# Patient Record
Sex: MALE | Race: WHITE | NOT HISPANIC OR LATINO | Employment: PART TIME | URBAN - METROPOLITAN AREA
[De-identification: names, ages, dates, MRNs, and addresses within clinical notes are randomized per-mention and may not be internally consistent; named-entity substitution may affect disease eponyms.]

---

## 2019-07-16 ENCOUNTER — OCCUPATIONAL HEALTH (OUTPATIENT)
Dept: URGENT CARE | Facility: CLINIC | Age: 44
End: 2019-07-16

## 2019-07-16 DIAGNOSIS — Z02.1 PRE-EMPLOYMENT EXAMINATION: Primary | ICD-10-CM

## 2019-07-16 LAB
BILIRUB UR QL STRIP: NEGATIVE
CTP QC/QA: YES
GLUCOSE UR QL STRIP: NEGATIVE
KETONES UR QL STRIP: NEGATIVE
LEUKOCYTE ESTERASE UR QL STRIP: NEGATIVE
PH, POC UA: 6 (ref 5–8)
POC 10 PANEL DRUG SCREEN: NEGATIVE
POC BLOOD, URINE: NEGATIVE
POC NITRATES, URINE: NEGATIVE
PROT UR QL STRIP: NEGATIVE
SP GR UR STRIP: 1.01 (ref 1–1.03)
UROBILINOGEN UR STRIP-ACNC: NORMAL (ref 0.3–2.2)

## 2019-07-16 PROCEDURE — 80305 DRUG TEST PRSMV DIR OPT OBS: CPT | Mod: QW,S$GLB,, | Performed by: NURSE PRACTITIONER

## 2019-07-16 PROCEDURE — 97750 PHYSICAL PERFORMANCE TEST: CPT | Mod: S$GLB,,, | Performed by: NURSE PRACTITIONER

## 2019-07-16 PROCEDURE — 99499 UNLISTED E&M SERVICE: CPT | Mod: S$GLB,,, | Performed by: NURSE PRACTITIONER

## 2019-07-16 PROCEDURE — 97750 PR LIFT TEST: ICD-10-PCS | Mod: S$GLB,,, | Performed by: NURSE PRACTITIONER

## 2019-07-16 PROCEDURE — 99499 PHYSICAL - BASIC COMPLEXITY: ICD-10-PCS | Mod: S$GLB,,, | Performed by: NURSE PRACTITIONER

## 2019-07-16 PROCEDURE — 80305 POCT RAPID DRUG SCREEN 10 PANEL: ICD-10-PCS | Mod: QW,S$GLB,, | Performed by: NURSE PRACTITIONER

## 2020-09-17 PROBLEM — H81.91 PERIPHERAL VESTIBULOPATHY OF RIGHT EAR: Status: ACTIVE | Noted: 2020-09-17

## 2020-09-20 ENCOUNTER — CLINICAL SUPPORT (OUTPATIENT)
Dept: CARDIOLOGY | Facility: HOSPITAL | Age: 45
DRG: 176 | End: 2020-09-20
Attending: EMERGENCY MEDICINE
Payer: MEDICAID

## 2020-09-20 ENCOUNTER — HOSPITAL ENCOUNTER (INPATIENT)
Facility: HOSPITAL | Age: 45
LOS: 3 days | Discharge: HOME | DRG: 176 | End: 2020-09-23
Attending: EMERGENCY MEDICINE | Admitting: FAMILY MEDICINE
Payer: MEDICAID

## 2020-09-20 DIAGNOSIS — I26.99 OTHER ACUTE PULMONARY EMBOLISM WITHOUT ACUTE COR PULMONALE: ICD-10-CM

## 2020-09-20 DIAGNOSIS — I26.99 ACUTE PULMONARY EMBOLISM WITHOUT ACUTE COR PULMONALE, UNSPECIFIED PULMONARY EMBOLISM TYPE: ICD-10-CM

## 2020-09-20 DIAGNOSIS — I26.99 PULMONARY EMBOLI: ICD-10-CM

## 2020-09-20 DIAGNOSIS — I26.99 PULMONARY EMBOLISM: ICD-10-CM

## 2020-09-20 DIAGNOSIS — R55 SYNCOPE, UNSPECIFIED SYNCOPE TYPE: Primary | ICD-10-CM

## 2020-09-20 DIAGNOSIS — I26.99 PE (PULMONARY THROMBOEMBOLISM): ICD-10-CM

## 2020-09-20 DIAGNOSIS — R42 DIZZINESS: ICD-10-CM

## 2020-09-20 PROBLEM — I82.409 ACUTE DEEP VEIN THROMBOSIS (DVT) OF LOWER EXTREMITY: Status: ACTIVE | Noted: 2020-09-20

## 2020-09-20 LAB
ALBUMIN SERPL BCP-MCNC: 4 G/DL (ref 3.5–5.2)
ALP SERPL-CCNC: 49 U/L (ref 55–135)
ALT SERPL W/O P-5'-P-CCNC: 34 U/L (ref 10–44)
AMPHET+METHAMPHET UR QL: NEGATIVE
ANION GAP SERPL CALC-SCNC: 10 MMOL/L (ref 8–16)
APTT PPP: 24.9 SEC (ref 23.6–33.3)
AST SERPL-CCNC: 29 U/L (ref 10–40)
BARBITURATES UR QL SCN>200 NG/ML: NEGATIVE
BASOPHILS # BLD AUTO: 0.04 K/UL (ref 0–0.2)
BASOPHILS NFR BLD: 0.5 % (ref 0–1.9)
BENZODIAZ UR QL SCN>200 NG/ML: NORMAL
BILIRUB SERPL-MCNC: 1 MG/DL (ref 0.1–1)
BNP SERPL-MCNC: 38 PG/ML (ref 0–99)
BUN SERPL-MCNC: 7 MG/DL (ref 6–20)
BZE UR QL SCN: NEGATIVE
CALCIUM SERPL-MCNC: 8.9 MG/DL (ref 8.7–10.5)
CANNABINOIDS UR QL SCN: NEGATIVE
CHLORIDE SERPL-SCNC: 103 MMOL/L (ref 95–110)
CO2 SERPL-SCNC: 26 MMOL/L (ref 23–29)
CREAT SERPL-MCNC: 1 MG/DL (ref 0.5–1.4)
CREAT UR-MCNC: 94 MG/DL (ref 23–375)
DIFFERENTIAL METHOD: NORMAL
EOSINOPHIL # BLD AUTO: 0.2 K/UL (ref 0–0.5)
EOSINOPHIL NFR BLD: 2.2 % (ref 0–8)
ERYTHROCYTE [DISTWIDTH] IN BLOOD BY AUTOMATED COUNT: 13.9 % (ref 11.5–14.5)
EST. GFR  (AFRICAN AMERICAN): >60 ML/MIN/1.73 M^2
EST. GFR  (NON AFRICAN AMERICAN): >60 ML/MIN/1.73 M^2
GLUCOSE SERPL-MCNC: 103 MG/DL (ref 70–110)
HCT VFR BLD AUTO: 44.9 % (ref 40–54)
HGB BLD-MCNC: 14.7 G/DL (ref 14–18)
IMM GRANULOCYTES # BLD AUTO: 0.03 K/UL (ref 0–0.04)
IMM GRANULOCYTES NFR BLD AUTO: 0.4 % (ref 0–0.5)
INR PPP: 1.2
LYMPHOCYTES # BLD AUTO: 1.6 K/UL (ref 1–4.8)
LYMPHOCYTES NFR BLD: 20.1 % (ref 18–48)
MAGNESIUM SERPL-MCNC: 2.1 MG/DL (ref 1.6–2.6)
MCH RBC QN AUTO: 27.5 PG (ref 27–31)
MCHC RBC AUTO-ENTMCNC: 32.7 G/DL (ref 32–36)
MCV RBC AUTO: 84 FL (ref 82–98)
MONOCYTES # BLD AUTO: 0.7 K/UL (ref 0.3–1)
MONOCYTES NFR BLD: 9 % (ref 4–15)
NEUTROPHILS # BLD AUTO: 5.3 K/UL (ref 1.8–7.7)
NEUTROPHILS NFR BLD: 67.8 % (ref 38–73)
NRBC BLD-RTO: 0 /100 WBC
OPIATES UR QL SCN: NEGATIVE
PCP UR QL SCN>25 NG/ML: NEGATIVE
PLATELET # BLD AUTO: 183 K/UL (ref 150–350)
PMV BLD AUTO: 10.1 FL (ref 9.2–12.9)
POTASSIUM SERPL-SCNC: 3.2 MMOL/L (ref 3.5–5.1)
PROT SERPL-MCNC: 6.9 G/DL (ref 6–8.4)
PROTHROMBIN TIME: 14.7 SEC (ref 10.6–14.8)
RBC # BLD AUTO: 5.34 M/UL (ref 4.6–6.2)
SARS-COV-2 RDRP RESP QL NAA+PROBE: NEGATIVE
SODIUM SERPL-SCNC: 139 MMOL/L (ref 136–145)
TOXICOLOGY INFORMATION: NORMAL
TROPONIN I SERPL DL<=0.01 NG/ML-MCNC: <0.03 NG/ML
TSH SERPL DL<=0.005 MIU/L-ACNC: 2.52 UIU/ML (ref 0.34–5.6)
TSH SERPL DL<=0.005 MIU/L-ACNC: 2.53 UIU/ML (ref 0.34–5.6)
WBC # BLD AUTO: 7.85 K/UL (ref 3.9–12.7)

## 2020-09-20 PROCEDURE — 25000003 PHARM REV CODE 250: Performed by: NURSE PRACTITIONER

## 2020-09-20 PROCEDURE — 63600175 PHARM REV CODE 636 W HCPCS: Performed by: EMERGENCY MEDICINE

## 2020-09-20 PROCEDURE — 93010 ELECTROCARDIOGRAM REPORT: CPT | Mod: ,,, | Performed by: SPECIALIST

## 2020-09-20 PROCEDURE — 93306 TTE W/DOPPLER COMPLETE: CPT

## 2020-09-20 PROCEDURE — 93306 ECHO (CUPID ONLY): ICD-10-PCS | Mod: 26,,, | Performed by: SPECIALIST

## 2020-09-20 PROCEDURE — 83880 ASSAY OF NATRIURETIC PEPTIDE: CPT

## 2020-09-20 PROCEDURE — 93306 TTE W/DOPPLER COMPLETE: CPT | Mod: 26,,, | Performed by: SPECIALIST

## 2020-09-20 PROCEDURE — 83735 ASSAY OF MAGNESIUM: CPT

## 2020-09-20 PROCEDURE — 96372 THER/PROPH/DIAG INJ SC/IM: CPT | Mod: 59

## 2020-09-20 PROCEDURE — 94761 N-INVAS EAR/PLS OXIMETRY MLT: CPT

## 2020-09-20 PROCEDURE — 85610 PROTHROMBIN TIME: CPT

## 2020-09-20 PROCEDURE — 99900035 HC TECH TIME PER 15 MIN (STAT)

## 2020-09-20 PROCEDURE — 96374 THER/PROPH/DIAG INJ IV PUSH: CPT

## 2020-09-20 PROCEDURE — 85730 THROMBOPLASTIN TIME PARTIAL: CPT

## 2020-09-20 PROCEDURE — 93005 ELECTROCARDIOGRAM TRACING: CPT | Performed by: SPECIALIST

## 2020-09-20 PROCEDURE — 84443 ASSAY THYROID STIM HORMONE: CPT

## 2020-09-20 PROCEDURE — 63600175 PHARM REV CODE 636 W HCPCS: Performed by: NURSE PRACTITIONER

## 2020-09-20 PROCEDURE — U0002 COVID-19 LAB TEST NON-CDC: HCPCS

## 2020-09-20 PROCEDURE — 80053 COMPREHEN METABOLIC PANEL: CPT

## 2020-09-20 PROCEDURE — 99291 CRITICAL CARE FIRST HOUR: CPT

## 2020-09-20 PROCEDURE — G0378 HOSPITAL OBSERVATION PER HR: HCPCS

## 2020-09-20 PROCEDURE — 25000003 PHARM REV CODE 250: Performed by: INTERNAL MEDICINE

## 2020-09-20 PROCEDURE — 80307 DRUG TEST PRSMV CHEM ANLYZR: CPT

## 2020-09-20 PROCEDURE — 25500020 PHARM REV CODE 255: Performed by: EMERGENCY MEDICINE

## 2020-09-20 PROCEDURE — 84484 ASSAY OF TROPONIN QUANT: CPT | Mod: 91

## 2020-09-20 PROCEDURE — 36415 COLL VENOUS BLD VENIPUNCTURE: CPT

## 2020-09-20 PROCEDURE — 63600175 PHARM REV CODE 636 W HCPCS: Performed by: STUDENT IN AN ORGANIZED HEALTH CARE EDUCATION/TRAINING PROGRAM

## 2020-09-20 PROCEDURE — 93010 EKG 12-LEAD: ICD-10-PCS | Mod: ,,, | Performed by: SPECIALIST

## 2020-09-20 PROCEDURE — 84443 ASSAY THYROID STIM HORMONE: CPT | Mod: 91

## 2020-09-20 PROCEDURE — 21400001 HC TELEMETRY ROOM

## 2020-09-20 PROCEDURE — 85025 COMPLETE CBC W/AUTO DIFF WBC: CPT

## 2020-09-20 RX ORDER — OXYCODONE AND ACETAMINOPHEN 10; 325 MG/1; MG/1
1 TABLET ORAL ONCE
Status: DISCONTINUED | OUTPATIENT
Start: 2020-09-20 | End: 2020-09-20

## 2020-09-20 RX ORDER — POTASSIUM CHLORIDE 20 MEQ/1
20 TABLET, EXTENDED RELEASE ORAL
Status: DISCONTINUED | OUTPATIENT
Start: 2020-09-20 | End: 2020-09-23 | Stop reason: HOSPADM

## 2020-09-20 RX ORDER — POTASSIUM CHLORIDE 7.45 MG/ML
40 INJECTION INTRAVENOUS
Status: DISCONTINUED | OUTPATIENT
Start: 2020-09-20 | End: 2020-09-23 | Stop reason: HOSPADM

## 2020-09-20 RX ORDER — POTASSIUM CHLORIDE 20 MEQ/1
40 TABLET, EXTENDED RELEASE ORAL ONCE
Status: COMPLETED | OUTPATIENT
Start: 2020-09-20 | End: 2020-09-20

## 2020-09-20 RX ORDER — ACETAMINOPHEN 325 MG/1
650 TABLET ORAL EVERY 8 HOURS PRN
Status: DISCONTINUED | OUTPATIENT
Start: 2020-09-20 | End: 2020-09-23 | Stop reason: HOSPADM

## 2020-09-20 RX ORDER — LANOLIN ALCOHOL/MO/W.PET/CERES
800 CREAM (GRAM) TOPICAL
Status: DISCONTINUED | OUTPATIENT
Start: 2020-09-20 | End: 2020-09-23 | Stop reason: HOSPADM

## 2020-09-20 RX ORDER — HYDROMORPHONE HYDROCHLORIDE 1 MG/ML
1 INJECTION, SOLUTION INTRAMUSCULAR; INTRAVENOUS; SUBCUTANEOUS
Status: DISCONTINUED | OUTPATIENT
Start: 2020-09-20 | End: 2020-09-20

## 2020-09-20 RX ORDER — TALC
6 POWDER (GRAM) TOPICAL NIGHTLY PRN
Status: DISCONTINUED | OUTPATIENT
Start: 2020-09-20 | End: 2020-09-23 | Stop reason: HOSPADM

## 2020-09-20 RX ORDER — POTASSIUM CHLORIDE 7.45 MG/ML
20 INJECTION INTRAVENOUS
Status: DISCONTINUED | OUTPATIENT
Start: 2020-09-20 | End: 2020-09-23 | Stop reason: HOSPADM

## 2020-09-20 RX ORDER — FAMOTIDINE 10 MG/ML
20 INJECTION INTRAVENOUS EVERY 12 HOURS
Status: DISCONTINUED | OUTPATIENT
Start: 2020-09-20 | End: 2020-09-20

## 2020-09-20 RX ORDER — ENOXAPARIN SODIUM 100 MG/ML
1 INJECTION SUBCUTANEOUS
Status: COMPLETED | OUTPATIENT
Start: 2020-09-20 | End: 2020-09-20

## 2020-09-20 RX ORDER — HYDROCODONE BITARTRATE AND ACETAMINOPHEN 5; 325 MG/1; MG/1
1 TABLET ORAL EVERY 6 HOURS PRN
Status: DISCONTINUED | OUTPATIENT
Start: 2020-09-20 | End: 2020-09-23 | Stop reason: HOSPADM

## 2020-09-20 RX ORDER — METOCLOPRAMIDE HYDROCHLORIDE 5 MG/ML
10 INJECTION INTRAMUSCULAR; INTRAVENOUS
Status: COMPLETED | OUTPATIENT
Start: 2020-09-20 | End: 2020-09-20

## 2020-09-20 RX ORDER — MAGNESIUM SULFATE HEPTAHYDRATE 40 MG/ML
2 INJECTION, SOLUTION INTRAVENOUS
Status: DISCONTINUED | OUTPATIENT
Start: 2020-09-20 | End: 2020-09-23 | Stop reason: HOSPADM

## 2020-09-20 RX ORDER — HYDROMORPHONE HYDROCHLORIDE 1 MG/ML
0.5 INJECTION, SOLUTION INTRAMUSCULAR; INTRAVENOUS; SUBCUTANEOUS EVERY 4 HOURS PRN
Status: DISCONTINUED | OUTPATIENT
Start: 2020-09-20 | End: 2020-09-20

## 2020-09-20 RX ORDER — ONDANSETRON 2 MG/ML
4 INJECTION INTRAMUSCULAR; INTRAVENOUS
Status: DISCONTINUED | OUTPATIENT
Start: 2020-09-20 | End: 2020-09-20

## 2020-09-20 RX ORDER — MAGNESIUM SULFATE HEPTAHYDRATE 40 MG/ML
4 INJECTION, SOLUTION INTRAVENOUS
Status: DISCONTINUED | OUTPATIENT
Start: 2020-09-20 | End: 2020-09-23 | Stop reason: HOSPADM

## 2020-09-20 RX ORDER — POTASSIUM CHLORIDE 20 MEQ/1
40 TABLET, EXTENDED RELEASE ORAL
Status: DISCONTINUED | OUTPATIENT
Start: 2020-09-20 | End: 2020-09-23 | Stop reason: HOSPADM

## 2020-09-20 RX ORDER — SODIUM CHLORIDE 0.9 % (FLUSH) 0.9 %
10 SYRINGE (ML) INJECTION
Status: DISCONTINUED | OUTPATIENT
Start: 2020-09-20 | End: 2020-09-23 | Stop reason: HOSPADM

## 2020-09-20 RX ORDER — ENOXAPARIN SODIUM 100 MG/ML
1 INJECTION SUBCUTANEOUS
Status: DISCONTINUED | OUTPATIENT
Start: 2020-09-20 | End: 2020-09-23 | Stop reason: HOSPADM

## 2020-09-20 RX ORDER — MAGNESIUM SULFATE 1 G/100ML
1 INJECTION INTRAVENOUS
Status: DISCONTINUED | OUTPATIENT
Start: 2020-09-20 | End: 2020-09-23 | Stop reason: HOSPADM

## 2020-09-20 RX ADMIN — ENOXAPARIN SODIUM 80 MG: 100 INJECTION SUBCUTANEOUS at 05:09

## 2020-09-20 RX ADMIN — ACETAMINOPHEN 650 MG: 325 TABLET, FILM COATED ORAL at 09:09

## 2020-09-20 RX ADMIN — ACETAMINOPHEN 650 MG: 325 TABLET, FILM COATED ORAL at 06:09

## 2020-09-20 RX ADMIN — METOCLOPRAMIDE 10 MG: 5 INJECTION, SOLUTION INTRAMUSCULAR; INTRAVENOUS at 04:09

## 2020-09-20 RX ADMIN — ENOXAPARIN SODIUM 80 MG: 80 INJECTION, SOLUTION INTRAVENOUS; SUBCUTANEOUS at 04:09

## 2020-09-20 RX ADMIN — IOHEXOL 100 ML: 350 INJECTION, SOLUTION INTRAVENOUS at 04:09

## 2020-09-20 RX ADMIN — POTASSIUM CHLORIDE 40 MEQ: 20 TABLET, EXTENDED RELEASE ORAL at 09:09

## 2020-09-20 NOTE — PROGRESS NOTES
"Admitted earlier today by overnight provider. Admission H&P and recent ED visit at [facility] (09/17) reviewed.     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 Around the same time frame he has been experiencing dizzy spells with falls. No triggers. Had extensive work-up for syncope at [facility] ED on 09/17. MRI brain with no acute intracranial abnormality. Seen by cardiology and was placed on 30-day Holter monitor and discharged home. Returns to our ED today for similar fainting spell associated with right sided chest tightness. Work-up revealed right upper lobe segmental pulmonary embolism. Started on enoxaparin at therapeutic dose.     Assessment  Syncope likely due to pulmonary embolism   Right upper lobe segmental pulmonary embolism; likely unprovoked     Plan:  Check orthostatics  Check Holter monitor to assess for arrhythmias   Continue enoxaparin. Consider warfarin for anticoagulation due to cost issues  Will need hypercoagulability work-up (outpatient)  Echocardiogram   Cardiology and pulmonology consulted on admission; follow recommendations       Michael Jacinto MD  Internal Medicine    "

## 2020-09-20 NOTE — ED PROVIDER NOTES
Encounter Date: 9/20/2020       History     Chief Complaint   Patient presents with    Dizziness     dizzy and numbness to whole body     45-year-old male presents to the ER with a chief complaint of dizziness, syncope and chest pain.  Patient states that he had a syncopal episode today however he was able to be caught for his head struck the ground.  His partner states that he has been having week's worth of fainting.  He was seen at [facility] with a he get an MRI and head CT as well as extensive workup which revealed no etiology to his syncope.  He was discharged to the Holter monitor.  Prior to arrival he had another syncopal episode this time associated with chest pain right upper chest nonradiating described as a pressure with no exacerbating or relieving factors.        Review of patient's allergies indicates:   Allergen Reactions    Pcn [penicillins]      No past medical history on file.  No past surgical history on file.  No family history on file.  Social History     Tobacco Use    Smoking status: Never Smoker    Smokeless tobacco: Current User   Substance Use Topics    Alcohol use: Not on file    Drug use: Not on file     Review of Systems   Constitutional: Negative for chills, fatigue and fever.   HENT: Negative for nosebleeds and sore throat.    Eyes: Negative for pain and discharge.   Respiratory: Negative for choking, shortness of breath and wheezing.    Cardiovascular: Positive for chest pain. Negative for palpitations.   Gastrointestinal: Negative for abdominal distention, abdominal pain, nausea and vomiting.   Genitourinary: Negative for dysuria, flank pain and urgency.   Musculoskeletal: Negative for back pain.   Skin: Negative for rash and wound.   Neurological: Positive for dizziness, syncope, numbness (Bilateral lower extremity) and headaches. Negative for weakness and light-headedness.   Hematological: Does not bruise/bleed easily.       Physical Exam     Initial Vitals  [09/20/20 0200]   BP Pulse Resp Temp SpO2   (!) 140/88 84 16 97.8 °F (36.6 °C) 99 %      MAP       --         Physical Exam    Nursing note and vitals reviewed.  Constitutional: He appears well-developed and well-nourished. No distress.   HENT:   Head: Normocephalic and atraumatic.   Mouth/Throat: Oropharynx is clear and moist. No oropharyngeal exudate.   Eyes: Pupils are equal, round, and reactive to light. Right eye exhibits no discharge. Left eye exhibits no discharge.   Neck: Normal range of motion. No tracheal deviation present. No JVD present.   Cardiovascular: Normal rate, regular rhythm and normal heart sounds. Exam reveals no gallop and no friction rub.    No murmur heard.  Pulmonary/Chest: Breath sounds normal. No respiratory distress. He has no wheezes. He has no rhonchi. He has no rales.   Abdominal: Soft. Bowel sounds are normal. He exhibits no distension. There is no abdominal tenderness. There is no rebound.   Musculoskeletal: No tenderness or edema.   Neurological: He is alert and oriented to person, place, and time. He has normal strength. No sensory deficit. GCS score is 15. GCS eye subscore is 4. GCS verbal subscore is 5. GCS motor subscore is 6.   Patient has 5/5 motor in upper and lower extremities.  Patient .  Unknown heel-to-shin are normal.  Patient intact sensation to soft touch in upper and lower extremities.  Cranial nerves 2 through 12 are intact.   Skin: Skin is warm. No erythema.         ED Course   Procedures  Labs Reviewed   COMPREHENSIVE METABOLIC PANEL - Abnormal; Notable for the following components:       Result Value    Potassium 3.2 (*)     Alkaline Phosphatase 49 (*)     All other components within normal limits   CBC W/ AUTO DIFFERENTIAL   B-TYPE NATRIURETIC PEPTIDE   TROPONIN I   SARS-COV-2 RNA AMPLIFICATION, QUAL   DRUG SCREEN PANEL, URINE EMERGENCY    Narrative:     Specimen Source->Urine   TROPONIN I   TSH   PROTIME-INR   APTT   TSH   TROPONIN I     EKG Readings:  (Independently Interpreted)   Initial Reading: No STEMI. Rhythm: Normal Sinus Rhythm. Ectopy: No Ectopy. Conduction: Normal. ST Segments: Normal ST Segments. T Waves: Normal.     ECG Results          EKG 12-lead (In process)  Result time 09/20/20 05:14:51    In process by Interface, Lab In [facility] (09/20/20 05:14:51)                 Narrative:    Test Reason : R42,    Vent. Rate : 077 BPM     Atrial Rate : 077 BPM     P-R Int : 138 ms          QRS Dur : 084 ms      QT Int : 372 ms       P-R-T Axes : 036 035 029 degrees     QTc Int : 420 ms    Normal sinus rhythm  Normal ECG  When compared with ECG of 17-SEP-2020 11:12,  No significant change was found    Referred By: [email or web address]   SELF           Confirmed By:                             Imaging Results          CTA Chest Non Coronary (Final result)  Result time 09/20/20 04:33:00    Final result by Gretel Green DO (09/20/20 04:33:00)                 Narrative:    CT ANGIOGRAM CHEST WITH IV CONTRAST: 9/20/2020 5:40 AM CDT    HISTORY: 45-year old patient with dizziness, chest pain.    TECHNIQUE:  Postcontrast CT through the chest was performed per protocol for CT angiography.  3D Multiplanar reformations were performed at the workstation. Reconstructed sagittal and coronal images were also obtained through the chest. This exam was performed according to our departmental dose-optimization program, which includes automated exposure control, adjustment of the mA and/or KV according to the patient's size and/or use of iterative reconstruction technique.    COMPARISON: None available    FINDINGS: The heart size is at the upper limits of normal in size. No large pericardial effusion is seen.    No significant mediastinal, supraclavicular, or axillary lymphadenopathy is seen.    The thoracic aorta is within normal limits of size.    There are filling defects seen within the right upper lobe segmental arteries consistent with pulmonary artery emboli. The main pulmonary  "artery is within normal limits in size. The right ventricle does not appear to be significantly enlarged.    The thyroid gland is unremarkable.    The central tracheobronchial tree is patent.    No focal consolidative airspace opacity is seen.    No discrete pleural effusion is seen.    Evaluation for lung nodules is limited by some respiratory motion artifact.    There is no evidence of a pneumothorax.    The bones demonstrate no suspicious lytic or blastic lesion.    The visualized portions of the upper abdomen appear grossly unremarkable.    IMPRESSION:  No acute airspace opacities are seen.    There are filling defects at the right upper lobe segmental arteries consistent with pulmonary emboli.    Electronically signed by:  Gretel Green DO  9/20/2020 5:42 AM CDT Workstation: [id number]                               Medical Decision Making:   Initial Assessment:   45-year-old male presents the ER with a chief complaint of syncope, chest pain.  Patient seen an outside facility with extensive workup for syncope however he came in tonight for the chest pain that occurred after his syncopal episode.  Differential Diagnosis:   Dehydration, anemia, arrhythmia, electrolyte abnormality, pulmonary embolism,.  Patient's MRI at outside facility ruled out CVA so it is unlikely this is the etiology of his dizziness.  ED Management:  Patient's EKG and troponin within normal limits.  Patient's urine tox was positive for benzos.  Patient's CBC and CMP is within normal limits.  Troponin BNP are within normal limits.  Patient's CT angio showed right upper lobe pulmonary embolism.  Patient states his history of pulmonary embolism was on Eliquis over is unable to afford it at this time.  For consult to hospitalist for anticoagulation    Artis Cage MD PGY3  "9/20/2020" 5:50AM            Attending Attestation:         Attending Critical Care:   Critical Care Times:   Direct Patient Care (initial evaluation, reassessments, and time " considering the case)................................................................15 minutes.   Additional History from reviewing old medical records or taking additional history from the family, EMS, PCP, etc.......................5 minutes.   Ordering, Reviewing, and Interpreting Diagnostic Studies...............................................................................................................5 minutes.   Documentation..................................................................................................................................................................................10 minutes.   Consultation with other Physicians. .................................................................................................................................................5 minutes.   ==============================================================  · Total Critical Care Time - exclusive of procedural time: 40 minutes.  ==============================================================  Critical care was necessary to treat or prevent imminent or life-threatening deterioration of the following conditions: anaphylaxis.   The following critical care procedures were done by me (see procedure notes): blood draw for specimens and pulse oximetry.   Critical care was time spent personally by me on the following activities: obtaining history from patient or relative, examination of patient, review of x-rays / CT sent with the patient, ordering lab, x-rays, and/or EKG, development of treatment plan with patient or relative, ordering and performing treatments and interventions, evaluation of patient's response to treatment, discussions with primary provider, interpretation of cardiac measurements and re-evaluation of patient's conition.   Critical Care Condition: potentially life-threatening       Attending ED Notes:   I have seen and evaluated the patient at bedside and I mostly agree with the  resident's history, physical examination, clinical impression, and disposition.  Please see my addendum below for further details and explanation.    Patient presents emergency department above-mentioned symptoms.  He is well-appearing nontoxic in no distress.  Further discussion he is out of his Eliquis and has not taken in several weeks.  Says he is unable to afford it.  He did commit to have right-sided chest pain any is found have several right-sided upper lobe pulmonary emboli.  No evidence of right heart strain.  Troponin and BNP negative.  He has been given Lovenox in the emergency department be admitted to hospitalist further care and evaluation of his pulmonary embolus.                    Clinical Impression:       ICD-10-CM ICD-9-CM   1. Dizziness  R42 780.4   2. Pulmonary embolism  I26.99 415.19   3. Pulmonary emboli  I26.99 415.19                          ED Disposition Condition    Observation                             Artis Cage MD  Resident  09/20/20 0550       Rufino Modi,   09/20/20 0614

## 2020-09-20 NOTE — CARE UPDATE
GRANT sent email to JOSE JUAN Padron for pt assistance in applying for medicaid. Pt currently does not have any insurance.

## 2020-09-20 NOTE — PLAN OF CARE
Pt assessment completed at bedside. Pt alone during assessment. Pt reports that he currently lives w/ his significant other. Pt reports he has one adult child. Pt reports that he does not have  a current living will. Pt currently does not have insurance, and reports he has been non compliant on medications because he cannot afford medicines. Pt reports he intends to d/c home w/ family and family will transport. Pt denies any other barriers at the current moment.     Case management will continue to follow          09/20/20 1918   Discharge Assessment   Assessment Type Discharge Planning Assessment   Confirmed/corrected address and phone number on facesheet? Yes   Assessment information obtained from? Patient;Medical Record   Communicated expected length of stay with patient/caregiver no   Prior to hospitilization cognitive status: Alert/Oriented   Prior to hospitalization functional status: Independent   Current cognitive status: Alert/Oriented   Current Functional Status: Independent   Facility Arrived From: home   Lives With significant other   Able to Return to Prior Arrangements yes   Is patient able to care for self after discharge? Yes   Who are your caregiver(s) and their phone number(s)? Ward avalos spouse [phone]   Patient's perception of discharge disposition home or selfcare   Readmission Within the Last 30 Days no previous admission in last 30 days   Patient currently being followed by outpatient case management? No   Patient currently receives any other outside agency services? No   Equipment Currently Used at Home none   Part D Coverage n/a   Do you have any problems affording any of your prescribed medications? No   Is the patient taking medications as prescribed? no   If no, which medications is patient not taking? eloquis - pt does not have insurance   Does the patient have transportation home? Yes   Transportation Anticipated family or friend will provide   Dialysis Name and Scheduled days n/a    Does the patient receive services at the [facility]? No   Discharge Plan A Home with family   Discharge Plan B Home with family   DME Needed Upon Discharge  none   Patient/Family in Agreement with Plan yes

## 2020-09-20 NOTE — PLAN OF CARE
09/20/20 0823   PRE-TX-O2   O2 Device (Oxygen Therapy) room air   SpO2 98 %   Pulse Oximetry Type Continuous   $ Pulse Oximetry - Multiple Charge Pulse Oximetry - Multiple   Pulse 70   Resp 19   Respiratory Evaluation   $ Care Plan Tech Time 15 min

## 2020-09-20 NOTE — ED NOTES
Pt stated that he was walking to his car approx 20 min ago and started to feel dizzy, lightheaded and faint. He stated he started to faint when his significant other caught him from falling. Pt also states it feels like someone is sitting on his chest on the right side.

## 2020-09-20 NOTE — H&P
[facility] Medicine History & Physical Examination   Patient Name: Cricket Brady  MRN: [id number]  Patient Class: Emergency   Admission Date: 9/20/2020  4:20 AM  Length of Stay: 0  Attending Physician:   Primary Care Provider: Primary Doctor No  Face-to-Face encounter date: 09/20/2020  Code Status:Full Code  MPOA:  Chief Complaint: Dizziness (dizzy and numbness to whole body)        Patient information was obtained from patient, past medical records and ER records.   HISTORY OF PRESENT ILLNESS:   Cricket Brady is a 45 y.o. old  male who  has no past medical history on file.. The patient presented to [facility] on 9/20/2020 with a primary complaint of Dizziness (dizzy and numbness to whole body)  .     45-year-old  male presents emergency room complaints of chest pain lightheadedness dizziness and syncope.  The patient states for the past week he has been having worsening lightheadedness and dizziness.  He also complains of some chest pain days midsternal radiating to his right side.  The patient had been having syncope episode full week he was seen at [facility] and had a full neurologic workup included MRI of the brain CT of the head neck all of which were negative.  The neurologist felt that the patient should be admitted route of for COVID-19.  The patient denies fever, chills  REVIEW OF SYSTEMS:   10 Point Review of System was performed and was found to be negative except for that mentioned already in the HPI and   Review of Systems (Negative unless checked off)  Review of Systems   Constitutional: Positive for malaise/fatigue.   HENT: Negative.    Eyes: Negative.    Respiratory: Positive for cough and shortness of breath.    Cardiovascular: Positive for chest pain.   Gastrointestinal: Negative.    Genitourinary: Negative.    Musculoskeletal: Negative.    Skin: Negative.    Neurological: Positive for dizziness.        Syncope   Endo/Heme/Allergies: Negative.      "       PAST MEDICAL HISTORY:   No past medical history on file.    PAST SURGICAL HISTORY:   No past surgical history on file.    ALLERGIES:   Pcn [penicillins]    FAMILY HISTORY:   No family history on file.    SOCIAL HISTORY:     Social History     Tobacco Use    Smoking status: Never Smoker    Smokeless tobacco: Current User   Substance Use Topics    Alcohol use: Not on file        Social History     Substance and Sexual Activity   Sexual Activity Not on file        HOME MEDICATIONS:     Prior to Admission medications    Medication Sig Start Date End Date Taking? Authorizing Provider   apixaban (ELIQUIS) 5 mg (74 tabs) DsPk For the first 7 days take two 5 mg tablets twice daily.  After 7 days take one 5 mg tablet twice daily. 1/26/20   Kevin Chatman NP   meclizine (ANTIVERT) 25 mg tablet Take 1 tablet (25 mg total) by mouth 3 (three) times daily as needed for Dizziness. 9/17/20   Kerry Arita MD         PHYSICAL EXAM:   BP (!) 140/88 (BP Location: Left arm, Patient Position: Sitting)   Pulse 84   Temp 97.8 °F (36.6 °C) (Oral)   Resp 16   Ht 5' 7" (1.702 m)   Wt 79.4 kg (175 lb)   SpO2 99%   BMI 27.41 kg/m²   Vitals Reviewed  General appearance: Well-developed, well-nourished male in no apparent distress.  Skin: No Rash.   Neuro: Motor and sensory exams grossly intact. Good tone. Power in all 4 extremities 5/5.   HENT: Atraumatic head. Moist mucous membranes of oral cavity.  Eyes: Normal extraocular movements.   Neck: Supple. No evidence of lymphadenopathy. No thyroidomegaly.  Lungs: Clear to auscultation bilaterally. No wheezing present.   Heart: Regular rate and rhythm. S1 and S2 present with no murmurs/gallop/rub. No pedal edema. No JVD present.   Abdomen: Soft, non-distended, non-tender. No rebound tenderness/guarding. No masses or organomegaly. Bowel sounds are normal. Bladder is not palpable.   Extremities: No cyanosis, clubbing, or edema.  Psych/mental status: Alert and oriented. Cooperative. " Responds appropriately to questions.   EMERGENCY DEPARTMENT LABS AND IMAGING:   Following labs were Reviewed   No results for input(s): WBC, HGB, HCT, PLT, GLUCOSE, CALCIUM, ALBUMIN, PROT, NA, K, CO2, CL, BUN, CREATININE, ALKPHOS, ALT, AST, BILITOT in the last 24 hours.      BMP: No results for input(s): GLU, NA, K, CL, CO2, BUN, CREATININE, CALCIUM, MG in the last 48 hours., CMP No results for input(s): NA, K, CL, CO2, GLU, BUN, CREATININE, CALCIUM, PROT, ALBUMIN, BILITOT, ALKPHOS, AST, ALT, ANIONGAP, ESTGFRAFRICA, EGFRNONAA in the last 48 hours., CBC No results for input(s): WBC, HGB, HCT, PLT in the last 48 hours., INR No results found for: INR, PROTIME, Lipid Panel No results found for: CHOL, HDL, LDLCALC, TRIG, CHOLHDL, Troponin   Recent Labs   Lab 09/17/20  0821   TROPONINI <0.012   , A1C: No results for input(s): HGBA1C in the last 4320 hours. and All labs within the past 24 hours have been reviewed  Microbiology Results (last 7 days)     ** No results found for the last 168 hours. **        CTA Chest Non Coronary    (Results Pending)     X-ray Chest 1 View    Result Date: 9/17/2020  EXAMINATION: XR CHEST 1 VIEW CLINICAL HISTORY: Dizziness. TECHNIQUE: Single frontal view of the chest was performed. COMPARISON: None FINDINGS: No consolidation, pleural effusion or pneumothorax. Cardiomediastinal silhouette and pulmonary vasculature are within normal limits. No acute osseous abnormality.     No acute findings in the chest. Electronically signed by: Cricket Barrientos Date:    09/17/2020 Time:    08:47    Ct Head Without Contrast    Result Date: 9/17/2020  EXAMINATION: CT HEAD WITHOUT CONTRAST CLINICAL HISTORY: Headache, post traumatic;Head trauma, coagulopathy (Age 19-64y); TECHNIQUE: Multiple cross-section of the head were obtained without the use of contrast.  Coronal and sagittal reformatted images were obtained. COMPARISON: None FINDINGS: No acute ischemic changes or infarct.  No intraparenchymal hemorrhage or  contusion.  No mass, mass effect, midline shift, edema, or extra-axial fluid collection.  The gray-white matter differentiation is maintained.  The cerebral sulci and basal cisterns are normal.  No hydrocephalus. The globes are intact.  The paranasal sinuses and mastoid air cells are well pneumatized.  No evidence for scalp hematoma laceration.  No radiopaque foreign body.     No acute intracranial abnormality.  Findings consistent with microangiopathy. Electronically signed by: Cricket Melendrez MD Date:    09/17/2020 Time:    09:07    Mri Brain Without Contrast    Result Date: 9/17/2020  EXAMINATION: MRI BRAIN WITHOUT CONTRAST CLINICAL HISTORY: Dizziness, non-specific;Dizziness, persistent/recurrent, cardiac or vascular cause suspected; Dizzy for 1 week, unable to hear conversations clearly. No history of seizures, surgery, MS or cancer. TECHNIQUE: Multiplanar multisequence MR imaging of the brain was performed without contrast. COMPARISON: CT head without contrast, 09/17/2020 FINDINGS: INTRACRANIAL: Few scattered T2 FLAIR hyperintense white matter foci are present which can be seen in normal individuals of this age.  Incidental mild tonsillar ectopia with tonsillar tips extending 2 mm beyond the foramen magnum with preserved inferior rounding of the tonsillar tips.  Question slight ventral displacement of the lower brainstem.  No parenchymal restricted diffusion.  No evidence of intracranial hemorrhage.  No extra-axial fluid collection or mass.  No intracranial mass effect.  No hydrocephalus.  Midline structures have a normal configuration.  Visualized pituitary gland and infundibulum are normal.  Visualized major intracranial vascular structures demonstrate normal flow voids and are normal in course and caliber. SINUSES: Trace bilateral ethmoid sinus mucosal thickening.  Trace right mastoid effusion. ORBITS: Visualized orbits are normal.     1. No acute intracranial hemorrhage or infarct. 2. Mild tonsillar ectopia,  approximately 2 mm beyond the foramen magnum, with preserved rounding of the tonsillar tips. Electronically signed by: Cricket Iliana Date:    09/17/2020 Time:    11:44        12 lead EKG reveals a normal sinus rhythm with a normal axis this good R-wave progression this no significant ST or T-wave abnormalities rate 77  milliseconds  ASSESSMENT & PLAN:   Cricket Brady is a 45 y.o. male admitted for    1. Syncope-  Patient had a full neurologic workup and seen 10 minutes hospital  -syncope may sick be secondary to PE  -2D echo complete  -carotid Doppler studies  -cardiology consult    2. PE to right upper lobe  -no saddle PE  -will start on Lovenox full dose  -consult pulmonary      3. H/O DVT has been non-compliant with Eliquis s/t inability to afford this medication Has been off for about 2 weeks  -placed on low-molecular weight heparin    DVT Prophylaxis: will be placed on Lovenox for DVT prophylaxis and will be advised to be as mobile as possible and sit in a chair as tolerated.   ________________________________________________________________________________    Discharge Planning and Disposition: No mobility needs. Ambulating well. Good social support system. Patient will be discharged in   Face-to-Face encounter date: 09/20/2020  Encounter included review of the medical records, interviewing and examining the patient face-to-face, discussion with family and other health care providers including emergency medicine physician, admission orders, interpreting lab/test results and formulating a plan of care.   Medical Decision Making during this encounter was  [_] Low Complexity  [_] Moderate Complexity  [x] High Complexity  _________________________________________________________________________________    INPATIENT LIST OF MEDICATIONS     Current Facility-Administered Medications:     HYDROmorphone injection 1 mg, 1 mg, Intravenous, ED 1 Time, Rufino Modi,     ondansetron injection 4 mg, 4 mg,  Intravenous, ED 1 Time, Rufino Modi, DO    oxyCODONE-acetaminophen  mg per tablet 1 tablet, 1 tablet, Oral, Once, Rufino Modi,     Current Outpatient Medications:     apixaban (ELIQUIS) 5 mg (74 tabs) DsPk, For the first 7 days take two 5 mg tablets twice daily.  After 7 days take one 5 mg tablet twice daily., Disp: 74 tablet, Rfl: 0    meclizine (ANTIVERT) 25 mg tablet, Take 1 tablet (25 mg total) by mouth 3 (three) times daily as needed for Dizziness., Disp: 20 tablet, Rfl: 0      Scheduled Meds:  Continuous Infusions:  PRN Meds:.      Ana Rosa  [facility] Hospitalist NP  09/20/2020

## 2020-09-21 LAB
ALBUMIN SERPL BCP-MCNC: 3.4 G/DL (ref 3.5–5.2)
ALP SERPL-CCNC: 45 U/L (ref 55–135)
ALT SERPL W/O P-5'-P-CCNC: 37 U/L (ref 10–44)
ANION GAP SERPL CALC-SCNC: 9 MMOL/L (ref 8–16)
AST SERPL-CCNC: 28 U/L (ref 10–40)
AT III ACT/NOR PPP CHRO: 89 % (ref 88–132)
AV INDEX (PROSTH): 0.97
AV MEAN GRADIENT: 3 MMHG
AV PEAK GRADIENT: 5 MMHG
AV VALVE AREA: 3.05 CM2
AV VELOCITY RATIO: 91.88
BASOPHILS # BLD AUTO: 0.07 K/UL (ref 0–0.2)
BASOPHILS NFR BLD: 1.2 % (ref 0–1.9)
BILIRUB SERPL-MCNC: 0.7 MG/DL (ref 0.1–1)
BSA FOR ECHO PROCEDURE: 1.93 M2
BUN SERPL-MCNC: 11 MG/DL (ref 6–20)
CALCIUM SERPL-MCNC: 8.9 MG/DL (ref 8.7–10.5)
CHLORIDE SERPL-SCNC: 103 MMOL/L (ref 95–110)
CO2 SERPL-SCNC: 29 MMOL/L (ref 23–29)
CREAT SERPL-MCNC: 1 MG/DL (ref 0.5–1.4)
CV ECHO LV RWT: 0.5 CM
DIFFERENTIAL METHOD: ABNORMAL
DOP CALC AO PEAK VEL: 1.16 M/S
DOP CALC AO VTI: 21.11 CM
DOP CALC LVOT AREA: 3.1 CM2
DOP CALC LVOT DIAMETER: 2 CM
DOP CALC LVOT PEAK VEL: 106.58 M/S
DOP CALC LVOT STROKE VOLUME: 64.46 CM3
DOP CALCLVOT PEAK VEL VTI: 20.53 CM
E WAVE DECELERATION TIME: 233.58 MSEC
E/A RATIO: 1.68
E/E' RATIO: 6.17 M/S
ECHO LV POSTERIOR WALL: 1.07 CM (ref 0.6–1.1)
EOSINOPHIL # BLD AUTO: 0.3 K/UL (ref 0–0.5)
EOSINOPHIL NFR BLD: 4.9 % (ref 0–8)
ERYTHROCYTE [DISTWIDTH] IN BLOOD BY AUTOMATED COUNT: 14 % (ref 11.5–14.5)
EST. GFR  (AFRICAN AMERICAN): >60 ML/MIN/1.73 M^2
EST. GFR  (NON AFRICAN AMERICAN): >60 ML/MIN/1.73 M^2
FIBRINOGEN PPP-MCNC: 330 MG/DL (ref 162–449)
FRACTIONAL SHORTENING: 33 % (ref 28–44)
GLUCOSE SERPL-MCNC: 104 MG/DL (ref 70–110)
HCT VFR BLD AUTO: 44.8 % (ref 40–54)
HGB BLD-MCNC: 14.2 G/DL (ref 14–18)
IMM GRANULOCYTES # BLD AUTO: 0.03 K/UL (ref 0–0.04)
IMM GRANULOCYTES NFR BLD AUTO: 0.5 % (ref 0–0.5)
INTERVENTRICULAR SEPTUM: 1.06 CM (ref 0.6–1.1)
IVRT: 49.73 MSEC
LEFT INTERNAL DIMENSION IN SYSTOLE: 2.83 CM (ref 2.1–4)
LEFT VENTRICLE MASS INDEX: 80 G/M2
LEFT VENTRICULAR INTERNAL DIMENSION IN DIASTOLE: 4.24 CM (ref 3.5–6)
LEFT VENTRICULAR MASS: 152.23 G
LV LATERAL E/E' RATIO: 5.69 M/S
LV SEPTAL E/E' RATIO: 6.73 M/S
LYMPHOCYTES # BLD AUTO: 1.9 K/UL (ref 1–4.8)
LYMPHOCYTES NFR BLD: 31.3 % (ref 18–48)
MCH RBC QN AUTO: 26.9 PG (ref 27–31)
MCHC RBC AUTO-ENTMCNC: 31.7 G/DL (ref 32–36)
MCV RBC AUTO: 85 FL (ref 82–98)
MONOCYTES # BLD AUTO: 0.6 K/UL (ref 0.3–1)
MONOCYTES NFR BLD: 10.2 % (ref 4–15)
MV PEAK A VEL: 0.44 M/S
MV PEAK E VEL: 0.74 M/S
NEUTROPHILS # BLD AUTO: 3.2 K/UL (ref 1.8–7.7)
NEUTROPHILS NFR BLD: 51.9 % (ref 38–73)
NRBC BLD-RTO: 0 /100 WBC
PISA TR MAX VEL: 2.41 M/S
PLATELET # BLD AUTO: 175 K/UL (ref 150–350)
PMV BLD AUTO: 10.1 FL (ref 9.2–12.9)
POTASSIUM SERPL-SCNC: 4.5 MMOL/L (ref 3.5–5.1)
PROT SERPL-MCNC: 6 G/DL (ref 6–8.4)
PV PEAK VELOCITY: 80.67 CM/S
RA PRESSURE: 3 MMHG
RBC # BLD AUTO: 5.27 M/UL (ref 4.6–6.2)
RIGHT VENTRICULAR END-DIASTOLIC DIMENSION: 292 CM
SODIUM SERPL-SCNC: 141 MMOL/L (ref 136–145)
TDI LATERAL: 0.13 M/S
TDI SEPTAL: 0.11 M/S
TDI: 0.12 M/S
TR MAX PG: 23 MMHG
TV REST PULMONARY ARTERY PRESSURE: 26 MMHG
WBC # BLD AUTO: 6.08 K/UL (ref 3.9–12.7)

## 2020-09-21 PROCEDURE — 63600175 PHARM REV CODE 636 W HCPCS: Performed by: NURSE PRACTITIONER

## 2020-09-21 PROCEDURE — 85613 RUSSELL VIPER VENOM DILUTED: CPT

## 2020-09-21 PROCEDURE — 99219 PR INITIAL OBSERVATION CARE,LEVL II: ICD-10-PCS | Mod: ,,, | Performed by: INTERNAL MEDICINE

## 2020-09-21 PROCEDURE — 86147 CARDIOLIPIN ANTIBODY EA IG: CPT | Mod: 59

## 2020-09-21 PROCEDURE — G0378 HOSPITAL OBSERVATION PER HR: HCPCS

## 2020-09-21 PROCEDURE — 81240 F2 GENE: CPT

## 2020-09-21 PROCEDURE — 85025 COMPLETE CBC W/AUTO DIFF WBC: CPT

## 2020-09-21 PROCEDURE — 25000003 PHARM REV CODE 250: Performed by: INTERNAL MEDICINE

## 2020-09-21 PROCEDURE — 81241 F5 GENE: CPT

## 2020-09-21 PROCEDURE — 85384 FIBRINOGEN ACTIVITY: CPT

## 2020-09-21 PROCEDURE — 85300 ANTITHROMBIN III ACTIVITY: CPT

## 2020-09-21 PROCEDURE — 85306 CLOT INHIBIT PROT S FREE: CPT

## 2020-09-21 PROCEDURE — 83090 ASSAY OF HOMOCYSTEINE: CPT

## 2020-09-21 PROCEDURE — 80053 COMPREHEN METABOLIC PANEL: CPT

## 2020-09-21 PROCEDURE — 36415 COLL VENOUS BLD VENIPUNCTURE: CPT

## 2020-09-21 PROCEDURE — 99219 PR INITIAL OBSERVATION CARE,LEVL II: CPT | Mod: ,,, | Performed by: INTERNAL MEDICINE

## 2020-09-21 PROCEDURE — 85303 CLOT INHIBIT PROT C ACTIVITY: CPT

## 2020-09-21 PROCEDURE — 25000003 PHARM REV CODE 250: Performed by: NURSE PRACTITIONER

## 2020-09-21 PROCEDURE — 21400001 HC TELEMETRY ROOM

## 2020-09-21 RX ORDER — WARFARIN SODIUM 5 MG/1
5 TABLET ORAL DAILY
Status: DISCONTINUED | OUTPATIENT
Start: 2020-09-21 | End: 2020-09-22

## 2020-09-21 RX ADMIN — ACETAMINOPHEN 650 MG: 325 TABLET, FILM COATED ORAL at 08:09

## 2020-09-21 RX ADMIN — HYDROCODONE BITARTRATE AND ACETAMINOPHEN 1 TABLET: 5; 325 TABLET ORAL at 11:09

## 2020-09-21 RX ADMIN — ENOXAPARIN SODIUM 80 MG: 80 INJECTION, SOLUTION INTRAVENOUS; SUBCUTANEOUS at 04:09

## 2020-09-21 RX ADMIN — ENOXAPARIN SODIUM 80 MG: 80 INJECTION, SOLUTION INTRAVENOUS; SUBCUTANEOUS at 05:09

## 2020-09-21 RX ADMIN — WARFARIN SODIUM 5 MG: 5 TABLET ORAL at 04:09

## 2020-09-21 NOTE — CONSULTS
Pulmonary/Critical Care Consult      Patient name: Cricket Brady  MRN: [id number]  Date: 09/21/2020    Admit Date: 9/20/2020  Consult Requested By: Michael Jacinto MD    Reason for Consult: PE    HPI:    9/21/2020 - 46 yo male with h/o LLE DVT about 1/2020 - treated with ELiquis  (didn't complete tx due to cost).  Has had issues with some dizziness and light headedness, was seen at [facility] ER with no diagnosis made and then came to [facility] ER, CTA has shown a RUL PE and he is admitted and being treated.  He is not a smoker, has no h/o cancer, no prolonged immobilization, no recent procedures.  He has a family h/o thrombosis (brother with early ASCVD, CVA and amputation and mother with h/o DVT).  He feels better today but has c/o HA.  ROS as below.    Review of Systems    Review of Systems   Constitutional: Positive for malaise/fatigue. Negative for chills, diaphoresis, fever and weight loss.   HENT: Negative for congestion.    Eyes: Negative for pain.   Respiratory: Positive for shortness of breath. Negative for cough, hemoptysis, sputum production, wheezing and stridor.    Cardiovascular: Positive for chest pain (right tightness). Negative for palpitations, orthopnea, claudication, leg swelling and PND.   Gastrointestinal: Negative for abdominal pain, blood in stool, constipation, diarrhea, heartburn, nausea and vomiting.   Genitourinary: Negative for dysuria, frequency, hematuria and urgency.   Musculoskeletal: Negative for falls and myalgias.   Neurological: Positive for dizziness, loss of consciousness and headaches. Negative for tingling, tremors, sensory change, speech change, focal weakness, seizures and weakness.   Endo/Heme/Allergies: Bruises/bleeds easily (some bruising to right calf posteriorly).   Psychiatric/Behavioral: Negative for depression, substance abuse and suicidal ideas. The patient is not nervous/anxious.         + stress related to work       Past Medical History    No past medical history on  "file.    Past Surgical History    No past surgical history on file.    Medications (scheduled):      enoxparin  1 mg/kg Subcutaneous Q12H       Medications (infusions):         Medications (prn):     acetaminophen, calcium chloride IVPB, calcium chloride IVPB, calcium chloride IVPB, HYDROcodone-acetaminophen, magnesium oxide, magnesium sulfate IVPB, magnesium sulfate IVPB, magnesium sulfate IVPB, magnesium sulfate IVPB, melatonin, potassium chloride in water, potassium chloride in water, potassium chloride in water, potassium chloride in water, potassium chloride, potassium chloride, potassium chloride, potassium chloride, sodium chloride 0.9%, sodium phosphate IVPB, sodium phosphate IVPB, sodium phosphate IVPB, sodium phosphate IVPB, sodium phosphate IVPB    Family History: No family history on file.    Social History: Tobacco:   Social History     Tobacco Use   Smoking Status Never Smoker   Smokeless Tobacco Current User                                EtOH:   Social History     Substance and Sexual Activity   Alcohol Use Not on file                                Drugs:   Social History     Substance and Sexual Activity   Drug Use Not on file                                Occupation: works as a [employer] manager                             Asbestos exposure: no    Physical Exam    Vital signs:  Temp:  [97.5 °F (36.4 °C)-98.5 °F (36.9 °C)]   Pulse:  [67-89]   Resp:  [18-25]   BP: ()/(57-81)   SpO2:  [95 %-98 %]     Intake/Output:     Intake/Output Summary (Last 24 hours) at 9/21/2020 0815  Last data filed at 9/20/2020 1500  Gross per 24 hour   Intake 720 ml   Output --   Net 720 ml        BMI: Estimated body mass index is 27.21 kg/m² as calculated from the following:    Height as of this encounter: 5' 7" (1.702 m).    Weight as of this encounter: 78.8 kg (173 lb 11.6 oz).    Physical Exam   Constitutional: He is oriented to person, place, and time and well-developed, well-nourished, and in no distress. No " distress.   HENT:   Head: Normocephalic and atraumatic.   Right Ear: External ear normal.   Left Ear: External ear normal.   Mouth/Throat: Oropharynx is clear and moist.   Eyes: Pupils are equal, round, and reactive to light. Conjunctivae and EOM are normal. Right eye exhibits no discharge. Left eye exhibits no discharge. No scleral icterus.   Neck: Normal range of motion. Neck supple. No JVD present. No tracheal deviation present. No thyromegaly present.   Cardiovascular: Normal rate, regular rhythm, normal heart sounds and intact distal pulses. Exam reveals no gallop and no friction rub.   No murmur heard.  Pulmonary/Chest: Effort normal and breath sounds normal. No stridor. No respiratory distress. He has no wheezes. He has no rales.   Abdominal: Soft. Bowel sounds are normal. He exhibits no distension. There is no abdominal tenderness. There is no rebound.   Musculoskeletal: Normal range of motion.         General: No tenderness, deformity or edema.      Comments: Some bruising to right calf posteriorly   Neurological: He is alert and oriented to person, place, and time. No cranial nerve deficit.   Skin: Skin is warm and dry. No rash noted. He is not diaphoretic. No erythema.   Psychiatric: Mood, memory, affect and judgment normal.   Nursing note and vitals reviewed.      Laboratory    Recent Labs   Lab 09/21/20  0504   WBC 6.08   RBC 5.27   HGB 14.2   HCT 44.8      MCV 85   MCH 26.9*   MCHC 31.7*       Recent Labs   Lab 09/21/20  0504   CALCIUM 8.9   PROT 6.0      K 4.5   CO2 29      BUN 11   CREATININE 1.0   ALKPHOS 45*   ALT 37   AST 28   BILITOT 0.7       No results for input(s): PT, INR, APTT in the last 24 hours.    Recent Labs   Lab 09/20/20  1727   TROPONINI <0.030       Additional labs: reviewed    Microbiology:       Microbiology Results (last 7 days)     ** No results found for the last 168 hours. **          Radiology    CXR and CTA reviewed    Additional  Studies    reviewed    Ventilator Information              No results for input(s): PH, PCO2, PO2, HCO3, POCSATURATED, BE in the last 72 hours.      Impression    Active Hospital Problems    Diagnosis  POA    *Acute pulmonary embolism [I26.99]  Yes    Syncope [R55]  Yes    Acute deep vein thrombosis (DVT) of lower extremity -non complaint with eliquis [I82.409]  Yes    Pulmonary embolism [I26.99]  Yes      Resolved Hospital Problems   No resolved problems to display.       Plan    · Continue anticoagulation and follow  · Should transition to coumadin as cost and coverage will be an issue  · Check hypercoaguable workup  · Check LE dopplers  · I am not convinced that the PE finding explains all of his episodes of dizziness    Thank you for this consult.  I will follow with you while the patient is hospitalized.  Please call ([phone]) if you have any questions.    Ricardo Jurado MD

## 2020-09-21 NOTE — PROGRESS NOTES
[facility] Medicine  Progress Note    Patient name: Cricket Brady  MRN: [id number]  Admit Date: 9/20/2020   LOS: 0 days     SUBJECTIVE:     Principal problem: Acute pulmonary embolism    Interval History:  No acute overnight events reported.  Headache is improved.  Denies chest pain, shortness of breath dizziness, lightheadedness or syncopal events.    Scheduled Meds:   enoxparin  1 mg/kg Subcutaneous Q12H    warfarin  5 mg Oral Daily     Continuous Infusions:  PRN Meds:acetaminophen, calcium chloride IVPB, calcium chloride IVPB, calcium chloride IVPB, HYDROcodone-acetaminophen, magnesium oxide, magnesium sulfate IVPB, magnesium sulfate IVPB, magnesium sulfate IVPB, magnesium sulfate IVPB, melatonin, potassium chloride in water, potassium chloride in water, potassium chloride in water, potassium chloride in water, potassium chloride, potassium chloride, potassium chloride, potassium chloride, sodium chloride 0.9%, sodium phosphate IVPB, sodium phosphate IVPB, sodium phosphate IVPB, sodium phosphate IVPB, sodium phosphate IVPB    Review of patient's allergies indicates:   Allergen Reactions    Pcn [penicillins]        Review of Systems: As per interval history    OBJECTIVE:     Vital Signs (Most Recent)  Temp: 98.1 °F (36.7 °C) (09/21/20 1130)  Pulse: 68 (09/21/20 1130)  Resp: 20 (09/21/20 1138)  BP: 127/77 (09/21/20 1130)  SpO2: 98 % (09/21/20 1130)    Vital Signs Range (Last 24H):  Temp:  [97.5 °F (36.4 °C)-98.5 °F (36.9 °C)]   Pulse:  [67-89]   Resp:  [18-25]   BP: ()/(57-81)   SpO2:  [95 %-98 %]     I & O (Last 24H):    Intake/Output Summary (Last 24 hours) at 9/21/2020 1305  Last data filed at 9/20/2020 1500  Gross per 24 hour   Intake 360 ml   Output --   Net 360 ml       Physical Exam:  General: Patient resting comfortably in no acute distress. Appears as stated age. Calm  Eyes: No conjunctival injection. No scleral icterus.  ENT: Hearing grossly intact. No discharge from ears. No  nasal discharge.   Neck: Supple, trachea midline. No JVD  CVS: RRR. No LE edema BL  Lungs: CTA BL, no wheezing or crackles. Good breath sounds. No accessory muscle use. No acute respiratory distress  Abdomen:  Soft, nontender and nondistended.  No organomegaly  Neuro: AOx3. Moves all extremities. Follows commands. Responds appropriately   Skin:  No rash or erythema noted    Laboratory:  CBC:   Recent Labs   Lab 09/21/20  0504   WBC 6.08   RBC 5.27   HGB 14.2   HCT 44.8      MCV 85   MCH 26.9*   MCHC 31.7*     CMP:   Recent Labs   Lab 09/21/20  0504      CALCIUM 8.9   ALBUMIN 3.4*   PROT 6.0      K 4.5   CO2 29      BUN 11   CREATININE 1.0   ALKPHOS 45*   ALT 37   AST 28   BILITOT 0.7       Diagnostic Results:  Labs: Reviewed  CT: Reviewed    ASSESSMENT/PLAN:     45-year-old male known DVT on apixaban which he stopped about 2 weeks ago admitted after presenting with chief complaint of fainting spells.  Workup revealed right upper lobe segmental pulmonary embolism for which he has been initiated on enoxaparin.    Active Hospital Problems    Diagnosis  POA    *Acute pulmonary embolism [I26.99]  Yes    Syncope [R55]  Yes    Acute deep vein thrombosis (DVT) of lower extremity -non complaint with eliquis [I82.409]  Yes    Pulmonary embolism [I26.99]  Yes      Resolved Hospital Problems   No resolved problems to display.       Plan:   Syncope - etiology unclear.  Right upper lobe segmental PE on admission  Continue telemetry monitoring.  He has 30 day Holter monitor which was placed during his [facility] on 09/17  Continue enoxaparin at therapeutic dose; started on warfarin secondary to prohibitive cost of apixaban  Check INR daily.  Will need bridging with enoxaparin at the time of discharge  Follow lower extremity Dopplers and echocardiogram  Hypercoagulable workup ordered as per pulmonology  Outpatient pulmonology follow-up    Disposition:  Likely discharge  tomorrow    VTE Risk Mitigation (From admission, onward)         Ordered     warfarin (COUMADIN) tablet 5 mg  Daily      09/21/20 1114     enoxaparin injection 80 mg  Every 12 hours (non-standard times)      09/20/20 0536     Reason for No Pharmacological VTE Prophylaxis  Once     Question:  Reasons:  Answer:  Already adequately anticoagulated on oral Anticoagulants    09/20/20 0534     IP VTE HIGH RISK PATIENT  Once      09/20/20 0534     Place sequential compression device  Until discontinued      09/20/20 0534                  Department Hospital Medicine  [facility]  Michael Jacinto MD  Date of service: 09/21/2020

## 2020-09-21 NOTE — PLAN OF CARE
09/21/20 1359   Discharge Reassessment   Assessment Type Discharge Planning Reassessment   Anticipated Discharge Disposition Home     SW following Pt and reviewing chart.  Pt will need assistance with medications and follow up with [facility] at discharge.  SW can make appointment for Pt and arrange for medications to be filled through [employer] pricing if he is not eligible for Medicaid in the immediate future.  Case Management to continue to follow.    Nellie Cuba, [facility]  Case Management  Ext. [phone]

## 2020-09-21 NOTE — NURSING
Pt transferred to Cardio B room 2515 all belongings sent with pt. Report given to Aleida FAJARDO. TERESO SPENCER.

## 2020-09-22 PROBLEM — I26.99 PE (PULMONARY THROMBOEMBOLISM): Status: ACTIVE | Noted: 2020-09-22

## 2020-09-22 LAB
ALBUMIN SERPL BCP-MCNC: 3.7 G/DL (ref 3.5–5.2)
ALP SERPL-CCNC: 43 U/L (ref 55–135)
ALT SERPL W/O P-5'-P-CCNC: 36 U/L (ref 10–44)
ANION GAP SERPL CALC-SCNC: 7 MMOL/L (ref 8–16)
AST SERPL-CCNC: 24 U/L (ref 10–40)
BASOPHILS # BLD AUTO: 0.05 K/UL (ref 0–0.2)
BASOPHILS NFR BLD: 0.8 % (ref 0–1.9)
BILIRUB SERPL-MCNC: 0.8 MG/DL (ref 0.1–1)
BUN SERPL-MCNC: 14 MG/DL (ref 6–20)
CALCIUM SERPL-MCNC: 9.1 MG/DL (ref 8.7–10.5)
CHLORIDE SERPL-SCNC: 102 MMOL/L (ref 95–110)
CO2 SERPL-SCNC: 30 MMOL/L (ref 23–29)
CREAT SERPL-MCNC: 1.2 MG/DL (ref 0.5–1.4)
DIFFERENTIAL METHOD: ABNORMAL
EOSINOPHIL # BLD AUTO: 0.3 K/UL (ref 0–0.5)
EOSINOPHIL NFR BLD: 4.2 % (ref 0–8)
ERYTHROCYTE [DISTWIDTH] IN BLOOD BY AUTOMATED COUNT: 13.8 % (ref 11.5–14.5)
EST. GFR  (AFRICAN AMERICAN): >60 ML/MIN/1.73 M^2
EST. GFR  (NON AFRICAN AMERICAN): >60 ML/MIN/1.73 M^2
GLUCOSE SERPL-MCNC: 104 MG/DL (ref 70–110)
HCT VFR BLD AUTO: 45.5 % (ref 40–54)
HCYS SERPL-SCNC: 14.2 UMOL/L (ref 0–14.5)
HGB BLD-MCNC: 14.9 G/DL (ref 14–18)
IMM GRANULOCYTES # BLD AUTO: 0.06 K/UL (ref 0–0.04)
IMM GRANULOCYTES NFR BLD AUTO: 1 % (ref 0–0.5)
INR PPP: 1.1
LYMPHOCYTES # BLD AUTO: 2.1 K/UL (ref 1–4.8)
LYMPHOCYTES NFR BLD: 33.3 % (ref 18–48)
MCH RBC QN AUTO: 27 PG (ref 27–31)
MCHC RBC AUTO-ENTMCNC: 32.7 G/DL (ref 32–36)
MCV RBC AUTO: 82 FL (ref 82–98)
MONOCYTES # BLD AUTO: 0.7 K/UL (ref 0.3–1)
MONOCYTES NFR BLD: 10.5 % (ref 4–15)
NEUTROPHILS # BLD AUTO: 3.1 K/UL (ref 1.8–7.7)
NEUTROPHILS NFR BLD: 50.2 % (ref 38–73)
NRBC BLD-RTO: 0 /100 WBC
PLATELET # BLD AUTO: 176 K/UL (ref 150–350)
PMV BLD AUTO: 10 FL (ref 9.2–12.9)
POTASSIUM SERPL-SCNC: 4.1 MMOL/L (ref 3.5–5.1)
PROT SERPL-MCNC: 6.5 G/DL (ref 6–8.4)
PROTHROMBIN TIME: 13.9 SEC (ref 10.6–14.8)
RBC # BLD AUTO: 5.52 M/UL (ref 4.6–6.2)
SODIUM SERPL-SCNC: 139 MMOL/L (ref 136–145)
WBC # BLD AUTO: 6.19 K/UL (ref 3.9–12.7)

## 2020-09-22 PROCEDURE — 99233 SBSQ HOSP IP/OBS HIGH 50: CPT | Mod: ,,, | Performed by: INTERNAL MEDICINE

## 2020-09-22 PROCEDURE — 99232 PR SUBSEQUENT HOSPITAL CARE,LEVL II: ICD-10-PCS | Mod: ,,, | Performed by: INTERNAL MEDICINE

## 2020-09-22 PROCEDURE — 36415 COLL VENOUS BLD VENIPUNCTURE: CPT

## 2020-09-22 PROCEDURE — 85610 PROTHROMBIN TIME: CPT

## 2020-09-22 PROCEDURE — 63600175 PHARM REV CODE 636 W HCPCS: Performed by: NURSE PRACTITIONER

## 2020-09-22 PROCEDURE — 25000003 PHARM REV CODE 250: Performed by: FAMILY MEDICINE

## 2020-09-22 PROCEDURE — 99232 SBSQ HOSP IP/OBS MODERATE 35: CPT | Mod: ,,, | Performed by: INTERNAL MEDICINE

## 2020-09-22 PROCEDURE — 80053 COMPREHEN METABOLIC PANEL: CPT

## 2020-09-22 PROCEDURE — 21400001 HC TELEMETRY ROOM

## 2020-09-22 PROCEDURE — 99233 PR SUBSEQUENT HOSPITAL CARE,LEVL III: ICD-10-PCS | Mod: ,,, | Performed by: INTERNAL MEDICINE

## 2020-09-22 PROCEDURE — 85025 COMPLETE CBC W/AUTO DIFF WBC: CPT

## 2020-09-22 RX ORDER — ALPRAZOLAM 0.25 MG/1
0.25 TABLET ORAL 3 TIMES DAILY PRN
Status: DISCONTINUED | OUTPATIENT
Start: 2020-09-22 | End: 2020-09-23 | Stop reason: HOSPADM

## 2020-09-22 RX ORDER — WARFARIN SODIUM 5 MG/1
10 TABLET ORAL DAILY
Status: DISCONTINUED | OUTPATIENT
Start: 2020-09-22 | End: 2020-09-23 | Stop reason: HOSPADM

## 2020-09-22 RX ADMIN — ENOXAPARIN SODIUM 80 MG: 80 INJECTION, SOLUTION INTRAVENOUS; SUBCUTANEOUS at 05:09

## 2020-09-22 RX ADMIN — ENOXAPARIN SODIUM 80 MG: 80 INJECTION, SOLUTION INTRAVENOUS; SUBCUTANEOUS at 07:09

## 2020-09-22 RX ADMIN — ALPRAZOLAM 0.25 MG: 0.25 TABLET ORAL at 02:09

## 2020-09-22 RX ADMIN — ALPRAZOLAM 0.25 MG: 0.25 TABLET ORAL at 11:09

## 2020-09-22 RX ADMIN — WARFARIN SODIUM 10 MG: 5 TABLET ORAL at 07:09

## 2020-09-22 NOTE — PLAN OF CARE
[facility] continues to follow Pt for discharge planning.  Spoke to Roxanna, who confirmed that Pt was approved for [place] Medicaid, and currently awaiting for system to indicate coverage.  Once coverage is verified, Pt can prepare for discharge home with medications and outpatient follow up.  Will await further response.    Nellie Cuba, ALIE  Case Management  Ext. [phone]

## 2020-09-22 NOTE — PROGRESS NOTES
"Count includes the [facility] Medicine  Progress Note    Patient Name: Cricket Brady  MRN: [id number]  Patient Class: OP- Observation   Admission Date: 9/20/2020  4:20 AM  Attending Physician: Mariyln Meza MD  Primary Care Provider: Primary Doctor No  Face-to-Face encounter date: 09/22/2020    Assessment & Plan:   Cricket Brady is a 45 y.o. male with:    PE  Bridging from lovenox to coumadin  Complicated by no insurance for outpt management  - daily INR  - hypercoagulopathy workup    Other chronic conditions:  Home meds as appropriate  Electrolyte derangement:  Trending BMP, Mg; Replacement prn  DVT ppx:  lovenox  FULL CODE    Discharge Planning:     Home when pt has appropriate follow-up scheduled for coumadin bridging.    Subjective:      9/22:  Pt very anxious, fighting with spouse and his workplace keeps calling him while in the hospital.  No SOB.  No CP.  Dizziness resolved.    Review of Systems   All systems reviewed and are negative except as noted per above.  Objective:     Physical Exam  /69 (BP Location: Right arm, Patient Position: Lying)   Pulse 87   Temp 97.1 °F (36.2 °C) (Oral)   Resp 15   Ht 5' 7" (1.702 m)   Wt 78.8 kg (173 lb 11.6 oz)   SpO2 96%   BMI 27.21 kg/m²     Gen: alert, responsive   HEENT:  Eyes - no pallor  External ears with no lesions  Nares patent  Mouth - lips chapped  CV: RRR  Lungs: CTA B/L  Abd: +BS, soft, NT, ND  Ext: no atrophy or edema  Skin: warm, dry  Neuro: grossly intact  Psych: pleasant    Recent Labs   Lab 09/22/20  0459   WBC 6.19   HGB 14.9   HCT 45.5        Recent Labs   Lab 09/22/20  0459   CALCIUM 9.1   ALBUMIN 3.7   PROT 6.5      K 4.1   CO2 30*      BUN 14   CREATININE 1.2   ALKPHOS 43*   ALT 36   AST 24   BILITOT 0.8     No results found for: POCTGLUCOSE   Microbiology Results (last 7 days)     ** No results found for the last 168 hours. **        US Lower Extremity Veins Bilateral   Final Result      US Carotid Bilateral   Final " Result      CTA Chest Non Coronary   Final Result          All labs, images, and other studies - including those not included in this note -- were reviewed personally by me.    Inpatient medications  Scheduled Meds:   enoxparin  1 mg/kg Subcutaneous Q12H    warfarin  5 mg Oral Daily     Continuous Infusions:  PRN Meds:.acetaminophen, ALPRAZolam, calcium chloride IVPB, calcium chloride IVPB, calcium chloride IVPB, HYDROcodone-acetaminophen, magnesium oxide, magnesium sulfate IVPB, magnesium sulfate IVPB, magnesium sulfate IVPB, magnesium sulfate IVPB, melatonin, potassium chloride in water, potassium chloride in water, potassium chloride in water, potassium chloride in water, potassium chloride, potassium chloride, potassium chloride, potassium chloride, sodium chloride 0.9%, sodium phosphate IVPB, sodium phosphate IVPB, sodium phosphate IVPB, sodium phosphate IVPB, sodium phosphate IVPB    Above encounter included review of the medical records, interviewing and examining the patient face-to-face, discussion with family and other health care providers, ordering and interpreting lab/test results and formulating a plan of care.     Marilyn Meza MD  [facility] Hospitalist

## 2020-09-22 NOTE — CONSULTS
[facility]  Department of Cardiology  Consult Note      PATIENT NAME: Cricket Brady  MRN: [id number]  TODAY'S DATE: 09/22/2020  ADMIT DATE: 9/20/2020                          CONSULT REQUESTED BY: Marilyn Meza MD    SUBJECTIVE     PRINCIPAL PROBLEM: Acute pulmonary embolism      REASON FOR CONSULT:  Syncope      HPI:  Patient is a 45-year-old gentleman who claims that he worked excessively in January had developed arm blood clot in his left leg was advised to take Eliquis which he completed approximately 2 months of treatment and subcu subsequently stopped it because of cost prohibitive are nature of the drug.  He could not afford it and did not qualify for any enrollment apparently.  So subsequently he had been doing fairly well until a week ago Saturday when he started having episodes of weak spells.  He was seen in [facility] [place] where he was diagnosed with vertigo and subsequently discharged home.  He had several episodes of fall weakness and at a family gathering over the weekend he had and a witnessed syncope and he was brought to the emergency room for evaluation in [facility] and is workup was consistent pulmonary embolism and is now admitted for further workup and management.  Patient has no chest pain or shortness of breath at this time denies having swelling in the lower extremities.  Some focal spot of discoloration is noted in the left leg otherwise no calf tenderness noted.  Since his admission here ultrasound of both lower extremities are negative for DVT.      From admit physicians notes     45-year-old  male presents emergency room complaints of chest pain lightheadedness dizziness and syncope.  The patient states for the past week he has been having worsening lightheadedness and dizziness.  He also complains of some chest pain days midsternal radiating to his right side.  The patient had been having syncope episode full week he was seen at [facility] and had a full neurologic workup included MRI of the brain CT of the head neck all of which were negative.  The neurologist felt that the patient should be admitted route of for COVID-19.  The patient denies fever, chills    Review of patient's allergies indicates:   Allergen Reactions    Pcn [penicillins]        No past medical history on file.  No past surgical history on file.  Social History     Tobacco Use    Smoking status: Never Smoker    Smokeless tobacco: Current User   Substance Use Topics    Alcohol use: Not on file    Drug use: Not on file        REVIEW OF SYSTEMS  CONSTITUTIONAL: Negative for chills, fatigue and fever.   EYES: No double vision, No blurred vision  NEURO: No headaches, episodes of weakness dizziness and fainting sensation noted leading up to his admission   RESPIRATORY: Negative for cough, shortness of breath and wheezing.  Also denies hemoptysis   CARDIOVASCULAR: Negative for chest pain. Negative for palpitations and leg swelling.   GI: Negative for abdominal pain, No melena, diarrhea, nausea and vomiting.   : Negative for dysuria and frequency, Negative for hematuria  SKIN: Negative for bruising, Negative for edema or discoloration noted.   ENDOCRINE: Negative for polyphagia, Negative for heat intolerance, Negative for cold intolerance  PSYCHIATRIC: Negative for depression, Negative for anxiety, Negative for memory loss  MUSCULOSKELETAL: Negative for neck pain, Negative for muscle weakness, Negative for back pain     OBJECTIVE     VITAL SIGNS (Most Recent)  Temp: 98.6 °F (37 °C) (09/22/20 0710)  Pulse: 78 (09/22/20 0710)  Resp: 18 (09/22/20 0710)  BP: 118/68 (09/22/20 0710)  SpO2: 98 % (09/22/20 0710)    VENTILATION STATUS  Resp: 18 (09/22/20 0710)  SpO2: 98 % (09/22/20 0710)       I & O (Last 24H):    Intake/Output Summary (Last 24 hours) at 9/22/2020 1053  Last data filed at 9/22/2020 0600  Gross per 24 hour   Intake 1320 ml   Output 1675 ml   Net -355 ml        WEIGHTS  Wt Readings from Last 1 Encounters:   09/20/20 0632 78.8 kg (173 lb 11.6 oz)   09/20/20 0200 79.4 kg (175 lb)       PHYSICAL EXAM  GENERAL: well built, well nourished, well-developed in no apparent distress alert and oriented.   HEENT: Normocephalic. Pupils normal and conjunctivae normal.  Mucous membranes normal, no cyanosis or icterus, trachea central,no pallor or icterus is noted..   NECK: No JVD. No bruit..   THYROID: Thyroid not enlarged. No nodules present..   CARDIAC: Regular rate and rhythm. S1 is normal.S2 is normal.No gallops, clicks or murmurs noted at this time.  CHEST ANATOMY: normal.   LUNGS: Clear to auscultation. No wheezing or rhonchi..   ABDOMEN: Soft no masses or organomegaly.  No abdomen pulsations or bruits.  Normal bowel sounds. No pulsations and no masses felt, No guarding or rebound.   URINARY: No ruvalcaba catheter   EXTREMITIES: No cyanosis, clubbing or edema noted at this time., no calf tenderness bilaterally.   PERIPHERAL VASCULAR SYSTEM: Good palpable distal pulses.   CENTRAL NERVOUS SYSTEM: No focal motor or sensory deficits noted.   SKIN: Skin without lesions, moist, well perfused.   MUSCLE STRENGTH & TONE: No noteable weakness, atrophy or abnormal movement.     HOME MEDICATIONS:  No current facility-administered medications on file prior to encounter.      Current Outpatient Medications on File Prior to Encounter   Medication Sig Dispense Refill    apixaban (ELIQUIS) 5 mg (74 tabs) DsPk For the first 7 days take two 5 mg tablets twice daily.  After 7 days take one 5 mg tablet twice daily. 74 tablet 0    meclizine (ANTIVERT) 25 mg tablet Take 1 tablet (25 mg total) by mouth 3 (three) times daily as needed for Dizziness. 20 tablet 0       SCHEDULED MEDS:   enoxparin  1 mg/kg Subcutaneous Q12H    warfarin  5 mg Oral Daily       CONTINUOUS INFUSIONS:    PRN MEDS:acetaminophen, ALPRAZolam, calcium chloride IVPB, calcium chloride IVPB, calcium chloride IVPB,  HYDROcodone-acetaminophen, magnesium oxide, magnesium sulfate IVPB, magnesium sulfate IVPB, magnesium sulfate IVPB, magnesium sulfate IVPB, melatonin, potassium chloride in water, potassium chloride in water, potassium chloride in water, potassium chloride in water, potassium chloride, potassium chloride, potassium chloride, potassium chloride, sodium chloride 0.9%, sodium phosphate IVPB, sodium phosphate IVPB, sodium phosphate IVPB, sodium phosphate IVPB, sodium phosphate IVPB    LABS AND DIAGNOSTICS     CBC LAST 3 DAYS  Recent Labs   Lab 09/20/20  0506 09/21/20 0504 09/22/20 0459   WBC 7.85 6.08 6.19   RBC 5.34 5.27 5.52   HGB 14.7 14.2 14.9   HCT 44.9 44.8 45.5   MCV 84 85 82   MCH 27.5 26.9* 27.0   MCHC 32.7 31.7* 32.7   RDW 13.9 14.0 13.8    175 176   MPV 10.1 10.1 10.0   GRAN 67.8  5.3 51.9  3.2 50.2  3.1   LYMPH 20.1  1.6 31.3  1.9 33.3  2.1   MONO 9.0  0.7 10.2  0.6 10.5  0.7   BASO 0.04 0.07 0.05   NRBC 0 0 0       COAGULATION LAST 3 DAYS  Recent Labs   Lab 09/20/20 0521 09/22/20 0459   LABPT 14.7 13.9   INR 1.2 1.1   APTT 24.9  --        CHEMISTRY LAST 3 DAYS  Recent Labs   Lab 09/20/20  0506 09/20/20 0521 09/21/20 0504 09/22/20 0459     --  141 139   K 3.2*  --  4.5 4.1     --  103 102   CO2 26  --  29 30*   ANIONGAP 10  --  9 7*   BUN 7  --  11 14   CREATININE 1.0  --  1.0 1.2     --  104 104   CALCIUM 8.9  --  8.9 9.1   MG  --  2.1  --   --    ALBUMIN 4.0  --  3.4* 3.7   PROT 6.9  --  6.0 6.5   ALKPHOS 49*  --  45* 43*   ALT 34  --  37 36   AST 29  --  28 24   BILITOT 1.0  --  0.7 0.8       CARDIAC PROFILE LAST 3 DAYS  Recent Labs   Lab 09/17/20  0821 09/20/20  0506 09/20/20  0521 09/20/20  1129 09/20/20  1727   BNP  --  38  --   --   --    *  --   --   --   --    TROPONINI <0.012 <0.030 <0.030 <0.030 <0.030       ENDOCRINE LAST 3 DAYS  Recent Labs   Lab 09/17/20  0821 09/20/20  0506 09/20/20  0521   TSH 2.050 2.530 2.525       LAST ARTERIAL BLOOD  GAS  ABG  No results for input(s): PH, PO2, PCO2, HCO3, BE in the last 168 hours.    LAST 7 DAYS MICROBIOLOGY   Microbiology Results (last 7 days)     ** No results found for the last 168 hours. **          MOST RECENT IMAGING  Echo Color Flow Doppler? Yes; Bubble Contrast? No  · There is left ventricular concentric remodeling.  · Normal left ventricular systolic function. The estimated ejection   fraction is 70%.  · Normal LV diastolic function.  · Normal right ventricular systolic function.  · No pulmonary hypertension present.  · Mild stenosis tricuspid stenosis.  · Normal central venous pressure (3 mmHg).  · The estimated PA systolic pressure is 26 mmHg.     US Lower Extremity Veins Bilateral  CLINICAL HISTORY:  45 years (1975) Male DVT history of pulmonary embolus.    TECHNIQUE:  US LOWER EXTREMITY VEINS BILATERAL.  41 images obtained. Ultrasound  examination of both lower extremity deep venous systems was performed  using grayscale, color and spectral Doppler    COMPARISON:  None available.    FINDINGS:  RIGHT: The right common femoral, saphenofemoral junction, femoral, and  popliteal veins demonstrate a normal response to compression  maneuvers. There is also a normal response to respiratory variation.  The bifurcation of the common femoral vein into the superficial and  deep femoral veins is visualized.  Flow is identified in these vessels  with no evidence of intraluminal thrombus on either color Doppler or  grayscale images. The visualized portions of the right proximal calf  veins are also normal.    LEFT: The left common femoral, saphenofemoral junction, femoral, and  popliteal veins demonstrate a normal response to compression  maneuvers. There is also a normal response to respiratory variation.  The bifurcation of the common femoral vein into the superficial and  deep femoral veins is visualized.  Flow is identified in these vessels  with no evidence of intraluminal thrombus on either color  Doppler or  grayscale images. The visualized portions of the left proximal calf  veins are also normal.    IMPRESSION:  No evidence of acute deep venous thrombosis in the visualized venous  segments of the bilateral lower extremities.    .    Electronically Signed by JOSE JUAN Ramirez on 9/21/2020 1:09 PM       CT ANGIOGRAM CHEST WITH IV CONTRAST: 9/20/2020 5:40 AM CDT     HISTORY: 45-year old patient with dizziness, chest pain.     TECHNIQUE:  Postcontrast CT through the chest was performed per protocol for CT angiography.  3D Multiplanar reformations were performed at the workstation. Reconstructed sagittal and coronal images were also obtained through the chest. This exam was performed according to our departmental dose-optimization program, which includes automated exposure control, adjustment of the mA and/or KV according to the patient's size and/or use of iterative reconstruction technique.     COMPARISON: None available     FINDINGS: The heart size is at the upper limits of normal in size. No large pericardial effusion is seen.     No significant mediastinal, supraclavicular, or axillary lymphadenopathy is seen.     The thoracic aorta is within normal limits of size.     There are filling defects seen within the right upper lobe segmental arteries consistent with pulmonary artery emboli. The main pulmonary artery is within normal limits in size. The right ventricle does not appear to be significantly enlarged.     The thyroid gland is unremarkable.     The central tracheobronchial tree is patent.     No focal consolidative airspace opacity is seen.     No discrete pleural effusion is seen.     Evaluation for lung nodules is limited by some respiratory motion artifact.     There is no evidence of a pneumothorax.     The bones demonstrate no suspicious lytic or blastic lesion.     The visualized portions of the upper abdomen appear grossly unremarkable.     IMPRESSION:  No acute airspace opacities are  seen.     There are filling defects at the right upper lobe segmental arteries consistent with pulmonary emboli.     Electronically signed by:  Gretel Green DO  9/20/2020 5:42 AM CDT Workstation: [id number]    ECHOCARDIOGRAM RESULTS (last 5)  Results for orders placed during the hospital encounter of 09/20/20   Echo Color Flow Doppler? Yes; Bubble Contrast? No    Narrative · There is left ventricular concentric remodeling.  · Normal left ventricular systolic function. The estimated ejection   fraction is 70%.  · Normal LV diastolic function.  · Normal right ventricular systolic function.  · No pulmonary hypertension present.  · Mild stenosis tricuspid stenosis.  · Normal central venous pressure (3 mmHg).  · The estimated PA systolic pressure is 26 mmHg.          CURRENT/PREVIOUS VISIT EKG  Results for orders placed or performed during the hospital encounter of 09/20/20   EKG 12-lead    Collection Time: 09/20/20  2:48 AM    Narrative    Test Reason : R42,    Vent. Rate : 077 BPM     Atrial Rate : 077 BPM     P-R Int : 138 ms          QRS Dur : 084 ms      QT Int : 372 ms       P-R-T Axes : 036 035 029 degrees     QTc Int : 420 ms    Normal sinus rhythm  Normal ECG  When compared with ECG of 17-SEP-2020 11:12,  No significant change was found  Confirmed by Earle RODRIGUEZ, Andres POTTER ([id number]) on 9/20/2020 8:02:40 PM    Referred By: [email or web address]   SELF           Confirmed By:Andres Og MD           ASSESSMENT/PLAN:     Active Hospital Problems    Diagnosis    *Acute pulmonary embolism    Syncope    Acute deep vein thrombosis (DVT) of lower extremity -non complaint with eliquis    Pulmonary embolism       ASSESSMENT & PLAN:   1.  Acute pulmonary embolism to the right upper lobe  2.  Recent history of deep vein thrombosis incomplete treatment  3.  Syncope as a result of pulmonary embolism      RECOMMENDATIONS:  His echocardiogram shows no evidence of right ventricular strain.  And patient remains hemodynamically  stable  Currently patient is treated with Lovenox while Coumadin dosing is being titrated upwards.  As patient is at high risk for recurrent deep vein thrombosis it may be prudent evaluate for hypercoagulable state at some point in time  Currently his rhythm remained stable, his EKG is unremarkable and echocardiogram lacks any RV strain so therefore conservative treatment for the time being.  He can follow-up with his primary care physician and consider any cardiac workup as needed based on course of events after discharge.  Thank you for the consultation      Cm Yost MD  [facility]  Department of Cardiology  Date of Service: 09/22/2020  10:53 AM

## 2020-09-22 NOTE — PLAN OF CARE
Problem: Adult Inpatient Plan of Care  Goal: Plan of Care Review  Outcome: Ongoing, Progressing     Problem: Adult Inpatient Plan of Care  Goal: Patient-Specific Goal (Individualization)  Outcome: Ongoing, Progressing     Problem: Adult Inpatient Plan of Care  Goal: Absence of Hospital-Acquired Illness or Injury  Outcome: Ongoing, Progressing     Problem: Adult Inpatient Plan of Care  Goal: Optimal Comfort and Wellbeing  Outcome: Ongoing, Progressing     Problem: Fall Injury Risk  Goal: Absence of Fall and Fall-Related Injury  Outcome: Ongoing, Progressing

## 2020-09-22 NOTE — PROGRESS NOTES
Pulmonary/Critical Care Progress Note      Patient name: Cricket Brady  MRN: [id number]  Date: 09/22/2020    Admit Date: 9/20/2020  Consult Requested By: Marilyn Meza MD    Reason for Consult: PE    HPI:    9/21/2020 - 44 yo male with h/o LLE DVT about 1/2020 - treated with ELiquis  (didn't complete tx due to cost).  Has had issues with some dizziness and light headedness, was seen at [facility] ER with no diagnosis made and then came to [facility] ER, CTA has shown a RUL PE and he is admitted and being treated.  He is not a smoker, has no h/o cancer, no prolonged immobilization, no recent procedures.  He has a family h/o thrombosis (brother with early ASCVD, CVA and amputation and mother with h/o DVT).  He feels better today but has c/o HA.  ROS as below.    9/22/2020 - Stable overnight and no new complaints.  Blood drawn for hypercoaguable wprkup.  Coumadin started.  US were negative for DVT, ECHO was OK.    Review of Systems    Review of Systems   Constitutional: Positive for malaise/fatigue. Negative for chills, diaphoresis, fever and weight loss.   HENT: Negative for congestion.    Eyes: Negative for pain.   Respiratory: Positive for shortness of breath. Negative for cough, hemoptysis, sputum production, wheezing and stridor.    Cardiovascular: Positive for chest pain (right tightness). Negative for palpitations, orthopnea, claudication, leg swelling and PND.   Gastrointestinal: Negative for abdominal pain, blood in stool, constipation, diarrhea, heartburn, nausea and vomiting.   Genitourinary: Negative for dysuria, frequency, hematuria and urgency.   Musculoskeletal: Negative for falls and myalgias.   Neurological: Positive for dizziness, loss of consciousness and headaches. Negative for tingling, tremors, sensory change, speech change, focal weakness, seizures and weakness.   Endo/Heme/Allergies: Bruises/bleeds easily (some bruising to right calf posteriorly).   Psychiatric/Behavioral: Negative for depression,  "substance abuse and suicidal ideas. The patient is not nervous/anxious.         + stress related to work       Past Medical History    No past medical history on file.    Past Surgical History    No past surgical history on file.    Medications (scheduled):      enoxparin  1 mg/kg Subcutaneous Q12H    warfarin  5 mg Oral Daily       Medications (infusions):         Medications (prn):     acetaminophen, calcium chloride IVPB, calcium chloride IVPB, calcium chloride IVPB, HYDROcodone-acetaminophen, magnesium oxide, magnesium sulfate IVPB, magnesium sulfate IVPB, magnesium sulfate IVPB, magnesium sulfate IVPB, melatonin, potassium chloride in water, potassium chloride in water, potassium chloride in water, potassium chloride in water, potassium chloride, potassium chloride, potassium chloride, potassium chloride, sodium chloride 0.9%, sodium phosphate IVPB, sodium phosphate IVPB, sodium phosphate IVPB, sodium phosphate IVPB, sodium phosphate IVPB    Family History: No family history on file.    Social History: Tobacco:   Social History     Tobacco Use   Smoking Status Never Smoker   Smokeless Tobacco Current User                                EtOH:   Social History     Substance and Sexual Activity   Alcohol Use Not on file                                Drugs:   Social History     Substance and Sexual Activity   Drug Use Not on file                                Occupation: works as a [employer] manager                             Asbestos exposure: no    Physical Exam    Vital signs:  Temp:  [97.6 °F (36.4 °C)-99 °F (37.2 °C)]   Pulse:  [64-91]   Resp:  [15-21]   BP: (114-130)/(64-77)   SpO2:  [96 %-98 %]     Intake/Output:     Intake/Output Summary (Last 24 hours) at 9/22/2020 0816  Last data filed at 9/22/2020 0600  Gross per 24 hour   Intake 1920 ml   Output 1675 ml   Net 245 ml        BMI: Estimated body mass index is 27.21 kg/m² as calculated from the following:    Height as of this encounter: 5' 7" (1.702 m).   "  Weight as of this encounter: 78.8 kg (173 lb 11.6 oz).    Physical Exam   Constitutional: He is oriented to person, place, and time and well-developed, well-nourished, and in no distress. No distress.   HENT:   Head: Normocephalic and atraumatic.   Right Ear: External ear normal.   Left Ear: External ear normal.   Mouth/Throat: Oropharynx is clear and moist.   Eyes: Pupils are equal, round, and reactive to light. Conjunctivae and EOM are normal. Right eye exhibits no discharge. Left eye exhibits no discharge. No scleral icterus.   Neck: Normal range of motion. Neck supple. No JVD present. No tracheal deviation present. No thyromegaly present.   Cardiovascular: Normal rate, regular rhythm, normal heart sounds and intact distal pulses. Exam reveals no gallop and no friction rub.   No murmur heard.  Pulmonary/Chest: Effort normal and breath sounds normal. No stridor. No respiratory distress. He has no wheezes. He has no rales.   Abdominal: Soft. Bowel sounds are normal. He exhibits no distension. There is no abdominal tenderness. There is no rebound.   Musculoskeletal: Normal range of motion.         General: No tenderness, deformity or edema.      Comments: Some bruising to right calf posteriorly   Neurological: He is alert and oriented to person, place, and time. No cranial nerve deficit.   Skin: Skin is warm and dry. No rash noted. He is not diaphoretic. No erythema.   Psychiatric: Mood, memory, affect and judgment normal.   Nursing note and vitals reviewed.      Laboratory    Recent Labs   Lab 09/22/20 0459   WBC 6.19   RBC 5.52   HGB 14.9   HCT 45.5      MCV 82   MCH 27.0   MCHC 32.7       Recent Labs   Lab 09/22/20  0459   CALCIUM 9.1   PROT 6.5      K 4.1   CO2 30*      BUN 14   CREATININE 1.2   ALKPHOS 43*   ALT 36   AST 24   BILITOT 0.8       Recent Labs   Lab 09/22/20 0459   INR 1.1       No results for input(s): CPK, CPKMB, TROPONINI, MB in the last 24 hours.    Additional labs:  reviewed    Microbiology:       Microbiology Results (last 7 days)     ** No results found for the last 168 hours. **          Radiology    CXR and CTA reviewed    Additional Studies    reviewed    Ventilator Information              No results for input(s): PH, PCO2, PO2, HCO3, POCSATURATED, BE in the last 72 hours.      Impression    Active Hospital Problems    Diagnosis  POA    *Acute pulmonary embolism [I26.99]  Yes    Syncope [R55]  Yes    Acute deep vein thrombosis (DVT) of lower extremity -non complaint with eliquis [I82.409]  Yes    Pulmonary embolism [I26.99]  Yes      Resolved Hospital Problems   No resolved problems to display.       Plan    · Continue anticoagulation and follow - would give 10 mg coumadin per day for 2-3 days and bridge with lovenox until INR > 2 (could be done as outpt if testing can be done and pt is willing)  · Should transition to coumadin as cost and coverage will be an issue  · Check hypercoaguable workup - pending  · I am not convinced that the PE finding explains all of his episodes of dizziness    Thank you for this consult.  I will follow with you while the patient is hospitalized.  Please call ([phone]) if you have any questions.    Ricardo Jurado MD

## 2020-09-23 VITALS
TEMPERATURE: 98 F | HEIGHT: 67 IN | OXYGEN SATURATION: 96 % | SYSTOLIC BLOOD PRESSURE: 96 MMHG | HEART RATE: 69 BPM | BODY MASS INDEX: 27.27 KG/M2 | RESPIRATION RATE: 16 BRPM | WEIGHT: 173.75 LBS | DIASTOLIC BLOOD PRESSURE: 58 MMHG

## 2020-09-23 PROBLEM — I82.409 ACUTE DEEP VEIN THROMBOSIS (DVT) OF LOWER EXTREMITY: Status: RESOLVED | Noted: 2020-09-20 | Resolved: 2020-09-23

## 2020-09-23 PROBLEM — R55 SYNCOPE: Status: RESOLVED | Noted: 2020-09-20 | Resolved: 2020-09-23

## 2020-09-23 LAB
ALBUMIN SERPL BCP-MCNC: 3.8 G/DL (ref 3.5–5.2)
ALP SERPL-CCNC: 43 U/L (ref 55–135)
ALT SERPL W/O P-5'-P-CCNC: 89 U/L (ref 10–44)
ANION GAP SERPL CALC-SCNC: 11 MMOL/L (ref 8–16)
AST SERPL-CCNC: 79 U/L (ref 10–40)
BASOPHILS # BLD AUTO: 0.06 K/UL (ref 0–0.2)
BASOPHILS NFR BLD: 1 % (ref 0–1.9)
BILIRUB SERPL-MCNC: 0.9 MG/DL (ref 0.1–1)
BUN SERPL-MCNC: 9 MG/DL (ref 6–20)
CALCIUM SERPL-MCNC: 8.9 MG/DL (ref 8.7–10.5)
CARDIOLIPIN IGA SER IA-ACNC: 10 MPL U/ML (ref 0–12)
CARDIOLIPIN IGG SER IA-ACNC: <9 GPL U/ML (ref 0–14)
CARDIOLIPIN IGM SER IA-ACNC: <9 APL U/ML (ref 0–11)
CHLORIDE SERPL-SCNC: 100 MMOL/L (ref 95–110)
CO2 SERPL-SCNC: 26 MMOL/L (ref 23–29)
CREAT SERPL-MCNC: 1 MG/DL (ref 0.5–1.4)
DIFFERENTIAL METHOD: ABNORMAL
EOSINOPHIL # BLD AUTO: 0.2 K/UL (ref 0–0.5)
EOSINOPHIL NFR BLD: 3.1 % (ref 0–8)
ERYTHROCYTE [DISTWIDTH] IN BLOOD BY AUTOMATED COUNT: 14.1 % (ref 11.5–14.5)
EST. GFR  (AFRICAN AMERICAN): >60 ML/MIN/1.73 M^2
EST. GFR  (NON AFRICAN AMERICAN): >60 ML/MIN/1.73 M^2
GLUCOSE SERPL-MCNC: 100 MG/DL (ref 70–110)
HCT VFR BLD AUTO: 48.1 % (ref 40–54)
HGB BLD-MCNC: 15.7 G/DL (ref 14–18)
IMM GRANULOCYTES # BLD AUTO: 0.07 K/UL (ref 0–0.04)
IMM GRANULOCYTES NFR BLD AUTO: 1.1 % (ref 0–0.5)
INR PPP: 1.2
LYMPHOCYTES # BLD AUTO: 1.9 K/UL (ref 1–4.8)
LYMPHOCYTES NFR BLD: 30.7 % (ref 18–48)
MCH RBC QN AUTO: 27 PG (ref 27–31)
MCHC RBC AUTO-ENTMCNC: 32.6 G/DL (ref 32–36)
MCV RBC AUTO: 83 FL (ref 82–98)
MONOCYTES # BLD AUTO: 0.6 K/UL (ref 0.3–1)
MONOCYTES NFR BLD: 10.1 % (ref 4–15)
NEUTROPHILS # BLD AUTO: 3.3 K/UL (ref 1.8–7.7)
NEUTROPHILS NFR BLD: 54 % (ref 38–73)
NRBC BLD-RTO: 0 /100 WBC
PLATELET # BLD AUTO: 199 K/UL (ref 150–350)
PMV BLD AUTO: 9.8 FL (ref 9.2–12.9)
POTASSIUM SERPL-SCNC: 4.2 MMOL/L (ref 3.5–5.1)
PROT C ACT/NOR PPP: 114 % (ref 73–180)
PROT S ACT/NOR PPP: 105 % (ref 63–140)
PROT SERPL-MCNC: 6.6 G/DL (ref 6–8.4)
PROTHROMBIN TIME: 14.4 SEC (ref 10.6–14.8)
RBC # BLD AUTO: 5.82 M/UL (ref 4.6–6.2)
SODIUM SERPL-SCNC: 137 MMOL/L (ref 136–145)
WBC # BLD AUTO: 6.16 K/UL (ref 3.9–12.7)

## 2020-09-23 PROCEDURE — 36415 COLL VENOUS BLD VENIPUNCTURE: CPT

## 2020-09-23 PROCEDURE — 99232 SBSQ HOSP IP/OBS MODERATE 35: CPT | Mod: ,,, | Performed by: INTERNAL MEDICINE

## 2020-09-23 PROCEDURE — 25000003 PHARM REV CODE 250: Performed by: INTERNAL MEDICINE

## 2020-09-23 PROCEDURE — 25000003 PHARM REV CODE 250: Performed by: FAMILY MEDICINE

## 2020-09-23 PROCEDURE — 80053 COMPREHEN METABOLIC PANEL: CPT

## 2020-09-23 PROCEDURE — 85025 COMPLETE CBC W/AUTO DIFF WBC: CPT

## 2020-09-23 PROCEDURE — 63600175 PHARM REV CODE 636 W HCPCS: Performed by: FAMILY MEDICINE

## 2020-09-23 PROCEDURE — 99232 PR SUBSEQUENT HOSPITAL CARE,LEVL II: ICD-10-PCS | Mod: ,,, | Performed by: INTERNAL MEDICINE

## 2020-09-23 PROCEDURE — 85610 PROTHROMBIN TIME: CPT

## 2020-09-23 PROCEDURE — 63600175 PHARM REV CODE 636 W HCPCS: Performed by: NURSE PRACTITIONER

## 2020-09-23 RX ORDER — ENOXAPARIN SODIUM 100 MG/ML
1 INJECTION SUBCUTANEOUS EVERY 12 HOURS
Qty: 14 ML | Refills: 0 | Status: SHIPPED | OUTPATIENT
Start: 2020-09-23 | End: 2020-10-13 | Stop reason: ALTCHOICE

## 2020-09-23 RX ORDER — HYDROCODONE BITARTRATE AND ACETAMINOPHEN 5; 325 MG/1; MG/1
1 TABLET ORAL EVERY 6 HOURS PRN
Qty: 15 TABLET | Refills: 0 | Status: SHIPPED | OUTPATIENT
Start: 2020-09-23 | End: 2020-10-24

## 2020-09-23 RX ORDER — ALPRAZOLAM 0.25 MG/1
0.25 TABLET ORAL 3 TIMES DAILY PRN
Qty: 15 TABLET | Refills: 0 | Status: SHIPPED | OUTPATIENT
Start: 2020-09-23 | End: 2020-10-24

## 2020-09-23 RX ORDER — PROCHLORPERAZINE EDISYLATE 5 MG/ML
10 INJECTION INTRAMUSCULAR; INTRAVENOUS ONCE
Status: COMPLETED | OUTPATIENT
Start: 2020-09-23 | End: 2020-09-23

## 2020-09-23 RX ORDER — KETOROLAC TROMETHAMINE 30 MG/ML
30 INJECTION, SOLUTION INTRAMUSCULAR; INTRAVENOUS ONCE
Status: COMPLETED | OUTPATIENT
Start: 2020-09-23 | End: 2020-09-23

## 2020-09-23 RX ORDER — WARFARIN 10 MG/1
10 TABLET ORAL DAILY
Qty: 30 TABLET | Refills: 0 | Status: SHIPPED | OUTPATIENT
Start: 2020-09-23 | End: 2020-10-13 | Stop reason: ALTCHOICE

## 2020-09-23 RX ADMIN — ALPRAZOLAM 0.25 MG: 0.25 TABLET ORAL at 12:09

## 2020-09-23 RX ADMIN — ENOXAPARIN SODIUM 80 MG: 80 INJECTION, SOLUTION INTRAVENOUS; SUBCUTANEOUS at 05:09

## 2020-09-23 RX ADMIN — SODIUM CHLORIDE 1000 ML: 0.9 INJECTION, SOLUTION INTRAVENOUS at 02:09

## 2020-09-23 RX ADMIN — KETOROLAC TROMETHAMINE 30 MG: 30 INJECTION, SOLUTION INTRAMUSCULAR at 02:09

## 2020-09-23 RX ADMIN — PROCHLORPERAZINE EDISYLATE 10 MG: 5 INJECTION INTRAMUSCULAR; INTRAVENOUS at 02:09

## 2020-09-23 RX ADMIN — HYDROCODONE BITARTRATE AND ACETAMINOPHEN 1 TABLET: 5; 325 TABLET ORAL at 12:09

## 2020-09-23 NOTE — PROGRESS NOTES
Pulmonary/Critical Care Progress Note      Patient name: Cricket Brady  MRN: [id number]  Date: 09/23/2020    Admit Date: 9/20/2020  Consult Requested By: Marilyn Meza MD    Reason for Consult: PE    HPI:    9/21/2020 - 44 yo male with h/o LLE DVT about 1/2020 - treated with ELiquis  (didn't complete tx due to cost).  Has had issues with some dizziness and light headedness, was seen at [facility] ER with no diagnosis made and then came to [facility] ER, CTA has shown a RUL PE and he is admitted and being treated.  He is not a smoker, has no h/o cancer, no prolonged immobilization, no recent procedures.  He has a family h/o thrombosis (brother with early ASCVD, CVA and amputation and mother with h/o DVT).  He feels better today but has c/o HA.  ROS as below.    9/22/2020 - Stable overnight and no new complaints.  Blood drawn for hypercoaguable wprkup.  Coumadin started.  US were negative for DVT, ECHO was OK.    9/23/2020 - Stable overnight, no new complaints, a few of hypercoaguability studies are back and are OK so far.    Review of Systems    Review of Systems   Constitutional: Positive for malaise/fatigue. Negative for chills, diaphoresis, fever and weight loss.   HENT: Negative for congestion.    Eyes: Negative for pain.   Respiratory: Positive for shortness of breath. Negative for cough, hemoptysis, sputum production, wheezing and stridor.    Cardiovascular: Positive for chest pain (right tightness). Negative for palpitations, orthopnea, claudication, leg swelling and PND.   Gastrointestinal: Negative for abdominal pain, blood in stool, constipation, diarrhea, heartburn, nausea and vomiting.   Genitourinary: Negative for dysuria, frequency, hematuria and urgency.   Musculoskeletal: Negative for falls and myalgias.   Neurological: Positive for dizziness, loss of consciousness and headaches. Negative for tingling, tremors, sensory change, speech change, focal weakness, seizures and weakness.   Endo/Heme/Allergies:  Bruises/bleeds easily (some bruising to right calf posteriorly).   Psychiatric/Behavioral: Negative for depression, substance abuse and suicidal ideas. The patient is not nervous/anxious.         + stress related to work       Past Medical History    No past medical history on file.    Past Surgical History    No past surgical history on file.    Medications (scheduled):      enoxparin  1 mg/kg Subcutaneous Q12H    warfarin  10 mg Oral Daily       Medications (infusions):         Medications (prn):     acetaminophen, ALPRAZolam, calcium chloride IVPB, calcium chloride IVPB, calcium chloride IVPB, HYDROcodone-acetaminophen, magnesium oxide, magnesium sulfate IVPB, magnesium sulfate IVPB, magnesium sulfate IVPB, magnesium sulfate IVPB, melatonin, potassium chloride in water, potassium chloride in water, potassium chloride in water, potassium chloride in water, potassium chloride, potassium chloride, potassium chloride, potassium chloride, sodium chloride 0.9%, sodium phosphate IVPB, sodium phosphate IVPB, sodium phosphate IVPB, sodium phosphate IVPB, sodium phosphate IVPB    Family History: No family history on file.    Social History: Tobacco:   Social History     Tobacco Use   Smoking Status Never Smoker   Smokeless Tobacco Current User                                EtOH:   Social History     Substance and Sexual Activity   Alcohol Use Not on file                                Drugs:   Social History     Substance and Sexual Activity   Drug Use Not on file                                Occupation: works as a [employer] manager                             Asbestos exposure: no    Physical Exam    Vital signs:  Temp:  [97.1 °F (36.2 °C)-98.2 °F (36.8 °C)]   Pulse:  [73-91]   Resp:  [14-16]   BP: (110-132)/(63-80)   SpO2:  [96 %-98 %]     Intake/Output:   No intake or output data in the 24 hours ending 09/23/20 0940     BMI: Estimated body mass index is 27.21 kg/m² as calculated from the following:    Height as of  "this encounter: 5' 7" (1.702 m).    Weight as of this encounter: 78.8 kg (173 lb 11.6 oz).    Physical Exam   Constitutional: He is oriented to person, place, and time and well-developed, well-nourished, and in no distress. No distress.   HENT:   Head: Normocephalic and atraumatic.   Right Ear: External ear normal.   Left Ear: External ear normal.   Mouth/Throat: Oropharynx is clear and moist.   Eyes: Pupils are equal, round, and reactive to light. Conjunctivae and EOM are normal. Right eye exhibits no discharge. Left eye exhibits no discharge. No scleral icterus.   Neck: Normal range of motion. Neck supple. No JVD present. No tracheal deviation present. No thyromegaly present.   Cardiovascular: Normal rate, regular rhythm, normal heart sounds and intact distal pulses. Exam reveals no gallop and no friction rub.   No murmur heard.  Pulmonary/Chest: Effort normal and breath sounds normal. No stridor. No respiratory distress. He has no wheezes. He has no rales.   Abdominal: Soft. Bowel sounds are normal. He exhibits no distension. There is no abdominal tenderness. There is no rebound.   Musculoskeletal: Normal range of motion.         General: No tenderness, deformity or edema.      Comments: Some bruising to right calf posteriorly   Neurological: He is alert and oriented to person, place, and time. No cranial nerve deficit.   Skin: Skin is warm and dry. No rash noted. He is not diaphoretic. No erythema.   Psychiatric: Mood, memory, affect and judgment normal.   Nursing note and vitals reviewed.      Laboratory    Recent Labs   Lab 09/23/20  0508   WBC 6.16   RBC 5.82   HGB 15.7   HCT 48.1      MCV 83   MCH 27.0   MCHC 32.6       Recent Labs   Lab 09/23/20  0508   CALCIUM 8.9   PROT 6.6      K 4.2   CO2 26      BUN 9   CREATININE 1.0   ALKPHOS 43*   ALT 89*   AST 79*   BILITOT 0.9       Recent Labs   Lab 09/23/20  0508   INR 1.2       No results for input(s): CPK, CPKMB, TROPONINI, MB in the last 24 " hours.    Additional labs: reviewed    Microbiology:       Microbiology Results (last 7 days)     ** No results found for the last 168 hours. **          Radiology    CXR and CTA reviewed    Additional Studies    reviewed    Ventilator Information              No results for input(s): PH, PCO2, PO2, HCO3, POCSATURATED, BE in the last 72 hours.      Impression    Active Hospital Problems    Diagnosis  POA    *Acute pulmonary embolism [I26.99]  Yes    PE (pulmonary thromboembolism) [I26.99]  Yes    Syncope [R55]  Yes    Acute deep vein thrombosis (DVT) of lower extremity -non complaint with eliquis [I82.409]  Yes    Pulmonary embolism [I26.99]  Yes      Resolved Hospital Problems   No resolved problems to display.       Plan    · Continue anticoagulation and follow - would give 10 mg coumadin per day for 2-3 days and bridge with lovenox until INR > 2 (could be done as outpt if testing can be done and pt is willing)  · Need to make sure jolene he has coverage for meds and lab testing as he will likely be chronically anticoagulated  · Check hypercoaguable workup - pending  · I am not convinced that the PE finding explains all of his episodes of dizziness    Thank you for this consult.  I will follow with you while the patient is hospitalized.  Please call ([phone]) if you have any questions.    Ricardo Jurado MD

## 2020-09-23 NOTE — PLAN OF CARE
Spoke with patient about follow-up care for Warfarin. LSW made patient access health appointment and patient provided with appointment date and time. Patient instructed to come to [facility] outpatient lab Friday morning for INR lab draw to be done prior to appointment later in the day. Patient provided with person instruction sheet laying out step by step for follow-up. Pt alert and oriented. Able to make needs known. Pt acknowledged information.         09/23/20 1516   Final Note   Assessment Type Final Discharge Note   Anticipated Discharge Disposition Home

## 2020-09-23 NOTE — PLAN OF CARE
09/23/20 1459   Final Note   Assessment Type Final Discharge Note   Anticipated Discharge Disposition Home     Pt to discharge home this date.  Pt's Medicaid now active with [facility], and prescriptions were filled through [facility] outpatient pharmacy here at [facility].  GRANT contacted Marie at [facility] ([phone]), and Pt will have telehealth appointment at 11:00 AM on Friday, 9/25/2020.  Dr. Meza ordering outpatient labs for him to present to [facility] to have done early Friday morning, so that he can be consulted on medications during his telehealth appointment on Friday.  Pt provided with this information and educated on the above.  No further needs addressed at this time.    Nellie Cuba, ALIE  Case Management  Ext. [phone]

## 2020-09-23 NOTE — DISCHARGE SUMMARY
Critical access hospital Medicine  Discharge Summary      Patient Name: Cricket Brady  MRN: [id number]  Admission Date: 9/20/2020  Hospital Length of Stay: 1 days  Discharge Date and Time: 9/23/2020  4:19 PM  Discharging Provider: Marilyn Meza MD    HPI:   Cricket Brady is a 45 y.o. old  male who  has no past medical history on file.. The patient presented to [facility] on 9/20/2020 with a primary complaint of Dizziness (dizzy and numbness to whole body)    45-year-old  male presents emergency room complaints of chest pain lightheadedness dizziness and syncope.  The patient states for the past week he has been having worsening lightheadedness and dizziness.  He also complains of some chest pain days midsternal radiating to his right side.  The patient had been having syncope episode full week he was seen at [facility] and had a full neurologic workup included MRI of the brain CT of the head neck all of which were negative.  The neurologist felt that the patient should be admitted route of for COVID-19.  The patient denies fever, chills    Hospital Course:     Pt admitted for pulmonary embolism.  Pre-syncopal episodes thought possibly due to PE.     PE  Bridging from lovenox to coumadin  Complicated by no insurance for outpt management  Pt applied for and was accepted by medicaid during inpt stay  - pulmonology following, thank you  - pt previously prescribed eliquis but he wasn't able to afford it previously (before admission)  - daily INR  - hypercoagulopathy workup in progress; strong family history of hypercoagulopathy  - pt provided with lovenox and warfarin for continued bridging at discharge, and was scheduled for telemedicine visit in 2 days.  Pt to get INR check before this visit.  Pt was provided with education and instructions and he v/u and agreement with this plan.  He will follow-up with family medicine asap.   - pt will also follow-up with cardiology and  neurology for further evaluation of dizziness      Patient was seen and examined on the date of discharge and determined to be suitable for discharge.    Temp:  [97.2 °F (36.2 °C)-98.2 °F (36.8 °C)]   Pulse:  [60-91]   Resp:  [14-20]   BP: ()/(58-80)   SpO2:  [96 %-97 %]     Gen: alert, responsive  HEENT:  Eyes - no pallor  External ears with no lesions  Nares patent  Mouth - lips chapped  CV: RRR  Lungs: CTA B/L  Abd: +BS, soft, NT, ND  Ext: no atrophy or edema  Skin: warm, dry  Neuro: grossly intact  Psych: pleasant    Imaging Results          US Carotid Bilateral (Final result)  Result time 09/20/20 06:49:00    Final result by Fei Frances Jr., MD (09/20/20 06:49:00)                 Narrative:    Examination: Carotid ultrasound.    CLINICAL HISTORY: Syncopal episode.    COMPARISON: None.    FINDINGS: Standard sonographic evaluation the bilateral carotid system  was employed utilizing grayscale and color-flow imaging.    FINDINGS: Scattered intimal hyperplasia with evidence scattered plaque  formation throughout the bilateral carotid system that is nonocclusive  and grayscale images reproduce no significant luminal stenosis above  that of 50%.    On the right side, the maximum peak systolic velocity of the right  carotid system measures 40.9 cm/s with evidence of CCA peak systolic  velocity of 61.2 cm/s giving estimated IC/CC ratio 0.7.    On the left side, the maximum peak systolic velocity of the ICA  measures 84 cc/s corresponding CCA velocities of 66 cm/s giving an  estimate IC/CC ratio 0.7.    There is evidence of appropriate antegrade flow within bilateral  vertebral arteries with normal flow parameters.    IMPRESSION: Scattered plaque formation throughout the bilateral  carotid system without evidence of flow-limiting stenosis based on the  rendered values.    Electronically Signed by Fei CMKEON on 9/20/2020 7:01 AM                             CTA Chest Non Coronary (Edited Result -  FINAL)  Result time 09/20/20 04:33:00    Edited Result - FINAL by Gretel Green DO (09/20/20 04:33:00)                 Narrative:        ADDENDUM #1       The report findings were discussed with Dr. Modi at approximately 5:47 am on 9/20/2020 with confirmation of the report receipt by the [facility] team.    Electronically signed by:  Gretel Green DO  9/20/2020 8:38 AM CDT Workstation: [id number]     ORIGINAL REPORT       CT ANGIOGRAM CHEST WITH IV CONTRAST: 9/20/2020 5:40 AM CDT    HISTORY: 45-year old patient with dizziness, chest pain.    TECHNIQUE:  Postcontrast CT through the chest was performed per protocol for CT angiography.  3D Multiplanar reformations were performed at the workstation. Reconstructed sagittal and coronal images were also obtained through the chest. This exam was performed according to our departmental dose-optimization program, which includes automated exposure control, adjustment of the mA and/or KV according to the patient's size and/or use of iterative reconstruction technique.    COMPARISON: None available    FINDINGS: The heart size is at the upper limits of normal in size. No large pericardial effusion is seen.    No significant mediastinal, supraclavicular, or axillary lymphadenopathy is seen.    The thoracic aorta is within normal limits of size.    There are filling defects seen within the right upper lobe segmental arteries consistent with pulmonary artery emboli. The main pulmonary artery is within normal limits in size. The right ventricle does not appear to be significantly enlarged.    The thyroid gland is unremarkable.    The central tracheobronchial tree is patent.    No focal consolidative airspace opacity is seen.    No discrete pleural effusion is seen.    Evaluation for lung nodules is limited by some respiratory motion artifact.    There is no evidence of a pneumothorax.    The bones demonstrate no suspicious lytic or blastic lesion.    The visualized portions of the  upper abdomen appear grossly unremarkable.    IMPRESSION:  No acute airspace opacities are seen.    There are filling defects at the right upper lobe segmental arteries consistent with pulmonary emboli.    Electronically signed by:  Gretel Green DO  9/20/2020 5:42 AM CDT Workstation: [id number]                  Final result by Gretel Green DO (09/20/20 04:33:00)                 Narrative:    CT ANGIOGRAM CHEST WITH IV CONTRAST: 9/20/2020 5:40 AM CDT    HISTORY: 45-year old patient with dizziness, chest pain.    TECHNIQUE:  Postcontrast CT through the chest was performed per protocol for CT angiography.  3D Multiplanar reformations were performed at the workstation. Reconstructed sagittal and coronal images were also obtained through the chest. This exam was performed according to our departmental dose-optimization program, which includes automated exposure control, adjustment of the mA and/or KV according to the patient's size and/or use of iterative reconstruction technique.    COMPARISON: None available    FINDINGS: The heart size is at the upper limits of normal in size. No large pericardial effusion is seen.    No significant mediastinal, supraclavicular, or axillary lymphadenopathy is seen.    The thoracic aorta is within normal limits of size.    There are filling defects seen within the right upper lobe segmental arteries consistent with pulmonary artery emboli. The main pulmonary artery is within normal limits in size. The right ventricle does not appear to be significantly enlarged.    The thyroid gland is unremarkable.    The central tracheobronchial tree is patent.    No focal consolidative airspace opacity is seen.    No discrete pleural effusion is seen.    Evaluation for lung nodules is limited by some respiratory motion artifact.    There is no evidence of a pneumothorax.    The bones demonstrate no suspicious lytic or blastic lesion.    The visualized portions of the upper abdomen appear grossly  unremarkable.    IMPRESSION:  No acute airspace opacities are seen.    There are filling defects at the right upper lobe segmental arteries consistent with pulmonary emboli.    Electronically signed by:  Gretel Green DO  9/20/2020 5:42 AM CDT Workstation: [id number]                                Consults:   Consults (From admission, onward)        Status Ordering Provider     Inpatient consult to Pulmonology  Once     Provider:  Ernestina Martin MD    Completed ANAHI FINE     Nutrition Services Referral  Once     Provider:  (Not yet assigned)    Completed MAXIMUS FINNEGAN          Final Active Diagnoses:    Diagnosis Date Noted POA    PRINCIPAL PROBLEM:  Acute pulmonary embolism [I26.99] 09/20/2020 Yes    PE (pulmonary thromboembolism) [I26.99] 09/22/2020 Yes    Pulmonary embolism [I26.99] 09/20/2020 Yes      Problems Resolved During this Admission:    Diagnosis Date Noted Date Resolved POA    Syncope [R55] 09/20/2020 09/23/2020 Yes    Acute deep vein thrombosis (DVT) of lower extremity -non complaint with eliquis [I82.409] 09/20/2020 09/23/2020 Yes       Discharged Condition: stable    Disposition: Home or Self Care    Follow Up:  Follow-up Information     Access [facility].    Why: Appointment scheduled for Friday, September 25, 2020 at 11:00 AM.  This will be a telehealth appointment so make sure you answer your phone and they have your correct contact information.  Contact information:  [address] [place] [address]  [phone]             Lab.    Why: PLEASE GET LABWORK DONE AT EARLIEST TIME ON FRIDAY MORNING 9/25/2020               Patient Instructions:      Protime-INR   Standing Status: Future Number of Occurrences: 1 Standing Exp. Date: 11/22/21     Ambulatory referral/consult to Cardiology   Standing Status: Future   Referral Priority: Routine Referral Type: Consultation   Referral Reason: Specialty Services Required   Referred to Provider: ADDIE  GABRIEL Requested Specialty: Cardiology   Number of Visits Requested: 1     Ambulatory referral/consult to Neurology   Standing Status: Future   Referral Priority: Routine Referral Type: Consultation   Referral Reason: Specialty Services Required   Referred to Provider: SHIRIN BOSCH Requested Specialty: Neurology   Number of Visits Requested: 1       Pending Diagnostic Studies:     Procedure Component Value Units Date/Time    Factor 5 leiden [[id number]] Collected: 09/21/20 1016    Order Status: Sent Lab Status: In process Updated: 09/21/20 1039    Specimen: Blood     Lupus anticoagulant (DRVVT) [[id number]] Collected: 09/21/20 1016    Order Status: Sent Lab Status: In process Updated: 09/21/20 1040    Specimen: Blood     Prothrombin gene mutation [[id number]] Collected: 09/21/20 1016    Order Status: Sent Lab Status: In process Updated: 09/21/20 1039    Specimen: Blood          Medications:  Reconciled Home Medications:      Medication List      START taking these medications    ALPRAZolam 0.25 MG tablet  Commonly known as: XANAX  Take 1 tablet (0.25 mg total) by mouth 3 (three) times daily as needed for Anxiety. DO NOT TAKE WITHIN 2 HOURS OF PAIN MEDICINE.     enoxaparin 100 mg/mL Syrg  Commonly known as: LOVENOX  Inject 0.8 mls into the skin every 12 hours     HYDROcodone-acetaminophen 5-325 mg per tablet  Commonly known as: NORCO  Take 1 tablet by mouth every 6 (six) hours as needed.     warfarin 10 MG tablet  Commonly known as: COUMADIN  Take 1 tablet (10 mg total) by mouth Daily.        STOP taking these medications    apixaban 5 mg (74 tabs) Dspk  Commonly known as: ELIQUIS DVT-PE TREAT 30D START     meclizine 25 mg tablet  Commonly known as: ANTIVERT            Indwelling Lines/Drains at time of discharge:   Lines/Drains/Airways     None               Marilyn Meza MD  Department of Hospital Medicine  [facility]

## 2020-09-23 NOTE — NURSING
Pt discharged with all belongings and follow up information. Iv removed and telemetry discontinued. Pt given education regarding follow up appointment and lab work. Pt verbalized understanding. Pt discharged with meds from [facility] .

## 2020-09-25 ENCOUNTER — LAB VISIT (OUTPATIENT)
Dept: LAB | Facility: HOSPITAL | Age: 45
End: 2020-09-25
Attending: FAMILY MEDICINE
Payer: MEDICAID

## 2020-09-25 DIAGNOSIS — I26.99 PULMONARY EMBOLISM: ICD-10-CM

## 2020-09-25 LAB
INR PPP: 2.8
PROTHROMBIN TIME: 28.7 SEC (ref 10.6–14.8)

## 2020-09-25 PROCEDURE — 36415 COLL VENOUS BLD VENIPUNCTURE: CPT

## 2020-09-25 PROCEDURE — 85610 PROTHROMBIN TIME: CPT

## 2020-09-26 LAB — F2 GENE MUT ANL BLD/T: NORMAL

## 2020-09-28 LAB — F5 GENE MUT ANL BLD/T: NORMAL

## 2020-09-30 LAB
CONFIRM DRVVT: NORMAL SEC
SCREEN DRVVT: 44 SEC

## 2020-10-01 ENCOUNTER — HOSPITAL ENCOUNTER (OUTPATIENT)
Dept: RADIOLOGY | Facility: HOSPITAL | Age: 45
Discharge: HOME | End: 2020-10-01
Attending: PEDIATRICS
Payer: MEDICAID

## 2020-10-01 DIAGNOSIS — R07.9 ACUTE CHEST PAIN: ICD-10-CM

## 2020-10-01 PROCEDURE — 71275 CT ANGIOGRAPHY CHEST: CPT | Mod: 26,,, | Performed by: RADIOLOGY

## 2020-10-01 PROCEDURE — 25500020 PHARM REV CODE 255

## 2020-10-01 PROCEDURE — 71275 CT ANGIOGRAPHY CHEST: CPT | Mod: TC

## 2020-10-01 PROCEDURE — 71275 CTA CHEST NON CORONARY: ICD-10-PCS | Mod: 26,,, | Performed by: RADIOLOGY

## 2020-10-01 RX ADMIN — IOHEXOL 75 ML: 350 INJECTION, SOLUTION INTRAVENOUS at 12:10

## 2020-10-09 ENCOUNTER — LAB VISIT (OUTPATIENT)
Dept: LAB | Facility: HOSPITAL | Age: 45
End: 2020-10-09
Attending: PEDIATRICS
Payer: MEDICAID

## 2020-10-09 DIAGNOSIS — I26.99 RECURRENT PULMONARY EMBOLI: ICD-10-CM

## 2020-10-09 LAB
APTT BLDCRRT: 26.3 SEC (ref 21–32)
INR PPP: 1 (ref 0.8–1.2)
PROTHROMBIN TIME: 10.7 SEC (ref 9–12.5)

## 2020-10-09 PROCEDURE — 36415 COLL VENOUS BLD VENIPUNCTURE: CPT

## 2020-10-09 PROCEDURE — 85610 PROTHROMBIN TIME: CPT

## 2020-10-09 PROCEDURE — 85730 THROMBOPLASTIN TIME PARTIAL: CPT

## 2020-10-24 ENCOUNTER — HOSPITAL ENCOUNTER (EMERGENCY)
Facility: HOSPITAL | Age: 45
Discharge: HOME | End: 2020-10-24
Attending: EMERGENCY MEDICINE
Payer: MEDICAID

## 2020-10-24 VITALS
RESPIRATION RATE: 18 BRPM | BODY MASS INDEX: 27.47 KG/M2 | DIASTOLIC BLOOD PRESSURE: 74 MMHG | SYSTOLIC BLOOD PRESSURE: 123 MMHG | WEIGHT: 175 LBS | OXYGEN SATURATION: 97 % | HEIGHT: 67 IN | TEMPERATURE: 98 F | HEART RATE: 80 BPM

## 2020-10-24 DIAGNOSIS — R07.81 PLEURITIC CHEST PAIN: ICD-10-CM

## 2020-10-24 DIAGNOSIS — R07.9 CHEST PAIN: ICD-10-CM

## 2020-10-24 DIAGNOSIS — R07.89 CHEST WALL PAIN: Primary | ICD-10-CM

## 2020-10-24 LAB
ALBUMIN SERPL BCP-MCNC: 4.2 G/DL (ref 3.5–5.2)
ALP SERPL-CCNC: 49 U/L (ref 55–135)
ALT SERPL W/O P-5'-P-CCNC: 38 U/L (ref 10–44)
ANION GAP SERPL CALC-SCNC: 10 MMOL/L (ref 8–16)
AST SERPL-CCNC: 33 U/L (ref 10–40)
BASOPHILS # BLD AUTO: 0.05 K/UL (ref 0–0.2)
BASOPHILS NFR BLD: 0.8 % (ref 0–1.9)
BILIRUB SERPL-MCNC: 1.5 MG/DL (ref 0.1–1)
BNP SERPL-MCNC: 20 PG/ML (ref 0–99)
BUN SERPL-MCNC: 7 MG/DL (ref 6–20)
CALCIUM SERPL-MCNC: 8.9 MG/DL (ref 8.7–10.5)
CHLORIDE SERPL-SCNC: 103 MMOL/L (ref 95–110)
CO2 SERPL-SCNC: 25 MMOL/L (ref 23–29)
CREAT SERPL-MCNC: 1 MG/DL (ref 0.5–1.4)
DIFFERENTIAL METHOD: ABNORMAL
EOSINOPHIL # BLD AUTO: 0.1 K/UL (ref 0–0.5)
EOSINOPHIL NFR BLD: 2 % (ref 0–8)
ERYTHROCYTE [DISTWIDTH] IN BLOOD BY AUTOMATED COUNT: 13.4 % (ref 11.5–14.5)
EST. GFR  (AFRICAN AMERICAN): >60 ML/MIN/1.73 M^2
EST. GFR  (NON AFRICAN AMERICAN): >60 ML/MIN/1.73 M^2
GLUCOSE SERPL-MCNC: 128 MG/DL (ref 70–110)
HCT VFR BLD AUTO: 43.4 % (ref 40–54)
HGB BLD-MCNC: 14.1 G/DL (ref 14–18)
IMM GRANULOCYTES # BLD AUTO: 0.04 K/UL (ref 0–0.04)
IMM GRANULOCYTES NFR BLD AUTO: 0.7 % (ref 0–0.5)
LYMPHOCYTES # BLD AUTO: 1.5 K/UL (ref 1–4.8)
LYMPHOCYTES NFR BLD: 24.5 % (ref 18–48)
MCH RBC QN AUTO: 27.4 PG (ref 27–31)
MCHC RBC AUTO-ENTMCNC: 32.5 G/DL (ref 32–36)
MCV RBC AUTO: 84 FL (ref 82–98)
MONOCYTES # BLD AUTO: 0.5 K/UL (ref 0.3–1)
MONOCYTES NFR BLD: 7.5 % (ref 4–15)
NEUTROPHILS # BLD AUTO: 3.9 K/UL (ref 1.8–7.7)
NEUTROPHILS NFR BLD: 64.5 % (ref 38–73)
NRBC BLD-RTO: 0 /100 WBC
PLATELET # BLD AUTO: 179 K/UL (ref 150–350)
PMV BLD AUTO: 9.8 FL (ref 9.2–12.9)
POTASSIUM SERPL-SCNC: 3.4 MMOL/L (ref 3.5–5.1)
PROT SERPL-MCNC: 7.2 G/DL (ref 6–8.4)
RBC # BLD AUTO: 5.14 M/UL (ref 4.6–6.2)
SODIUM SERPL-SCNC: 138 MMOL/L (ref 136–145)
TROPONIN I SERPL DL<=0.01 NG/ML-MCNC: <0.03 NG/ML
WBC # BLD AUTO: 6 K/UL (ref 3.9–12.7)

## 2020-10-24 PROCEDURE — 63600175 PHARM REV CODE 636 W HCPCS: Performed by: EMERGENCY MEDICINE

## 2020-10-24 PROCEDURE — 25000003 PHARM REV CODE 250: Performed by: EMERGENCY MEDICINE

## 2020-10-24 PROCEDURE — 93010 ELECTROCARDIOGRAM REPORT: CPT | Mod: ,,, | Performed by: INTERNAL MEDICINE

## 2020-10-24 PROCEDURE — 99285 EMERGENCY DEPT VISIT HI MDM: CPT | Mod: 25

## 2020-10-24 PROCEDURE — 80053 COMPREHEN METABOLIC PANEL: CPT

## 2020-10-24 PROCEDURE — 93010 EKG 12-LEAD: ICD-10-PCS | Mod: ,,, | Performed by: INTERNAL MEDICINE

## 2020-10-24 PROCEDURE — 93005 ELECTROCARDIOGRAM TRACING: CPT | Performed by: INTERNAL MEDICINE

## 2020-10-24 PROCEDURE — 84484 ASSAY OF TROPONIN QUANT: CPT

## 2020-10-24 PROCEDURE — 85025 COMPLETE CBC W/AUTO DIFF WBC: CPT

## 2020-10-24 PROCEDURE — 96374 THER/PROPH/DIAG INJ IV PUSH: CPT

## 2020-10-24 PROCEDURE — 83880 ASSAY OF NATRIURETIC PEPTIDE: CPT

## 2020-10-24 RX ORDER — KETOROLAC TROMETHAMINE 30 MG/ML
15 INJECTION, SOLUTION INTRAMUSCULAR; INTRAVENOUS
Status: COMPLETED | OUTPATIENT
Start: 2020-10-24 | End: 2020-10-24

## 2020-10-24 RX ORDER — METHOCARBAMOL 500 MG/1
1000 TABLET, FILM COATED ORAL
Status: COMPLETED | OUTPATIENT
Start: 2020-10-24 | End: 2020-10-24

## 2020-10-24 RX ORDER — METHOCARBAMOL 500 MG/1
1000 TABLET, FILM COATED ORAL 3 TIMES DAILY
Qty: 30 TABLET | Refills: 0 | Status: SHIPPED | OUTPATIENT
Start: 2020-10-24 | End: 2020-10-29

## 2020-10-24 RX ORDER — POTASSIUM CHLORIDE 1.5 G/1.58G
40 POWDER, FOR SOLUTION ORAL
Status: COMPLETED | OUTPATIENT
Start: 2020-10-24 | End: 2020-10-24

## 2020-10-24 RX ORDER — ACETAMINOPHEN 325 MG/1
325 TABLET ORAL EVERY 6 HOURS PRN
COMMUNITY

## 2020-10-24 RX ORDER — IBUPROFEN 600 MG/1
600 TABLET ORAL EVERY 6 HOURS PRN
Qty: 20 TABLET | Refills: 0 | Status: SHIPPED | OUTPATIENT
Start: 2020-10-24

## 2020-10-24 RX ADMIN — KETOROLAC TROMETHAMINE 15 MG: 30 INJECTION, SOLUTION INTRAMUSCULAR; INTRAVENOUS at 01:10

## 2020-10-24 RX ADMIN — METHOCARBAMOL 1000 MG: 500 TABLET ORAL at 01:10

## 2020-10-24 RX ADMIN — POTASSIUM CHLORIDE 40 MEQ: 1.5 POWDER, FOR SOLUTION ORAL at 02:10

## 2020-10-24 NOTE — ED PROVIDER NOTES
Encounter Date: 10/24/2020       History     Chief Complaint   Patient presents with    CHEST WALL PAIN     RT SIDED, RECENT PE DX 3 WEEKS AGO, AND TREATED FOR  POSS PNEUMONIA X 1 WEEK    Cough     X COUPLE DAYS     45-year-old male with past medical history of dementia, thrombosis, heart disease and pneumonia presents secondary to right-sided chest pain.  Patient states he was diagnosed with a PE as well as treated for pneumonia x1 week.  He describes the pain as sharp with mild pressure and no radiation.  He states it is exacerbated by moving and different positions.  Patient denies any recent fevers, chills, sweats, nausea, vomiting or shortness of breath associated.  He is otherwise stable and has other complaints.        Review of patient's allergies indicates:   Allergen Reactions    Pcn [penicillins]      Past Medical History:   Diagnosis Date    DVT of lower extremity (deep venous thrombosis) 01/26/2020    Left leg    Pulmonary embolism 2012    Initial PE in 2012 followed by recurrance in 9/2020    Syncope 09/2020     History reviewed. No pertinent surgical history.  Family History   Problem Relation Age of Onset    Thrombosis Mother     Heart disease Brother     Thrombosis Brother     Dementia Brother      Social History     Tobacco Use    Smoking status: Never Smoker    Smokeless tobacco: Current User   Substance Use Topics    Alcohol use: Yes    Drug use: Not on file     Review of Systems   Constitutional: Negative for fever.   HENT: Negative for sore throat.    Respiratory: Negative for shortness of breath.    Cardiovascular: Positive for chest pain.   Gastrointestinal: Negative for nausea.   Genitourinary: Negative for dysuria.   Musculoskeletal: Negative for back pain.   Skin: Negative for rash.   Neurological: Negative for weakness.   Hematological: Does not bruise/bleed easily.   All other systems reviewed and are negative.      Physical Exam     Initial Vitals [10/24/20 1225]   BP  Pulse Resp Temp SpO2   (!) 154/85 85 20 98.1 °F (36.7 °C) 97 %      MAP       --         Physical Exam    Nursing note and vitals reviewed.  Constitutional: He appears well-developed and well-nourished. He is not diaphoretic. No distress.   HENT:   Head: Normocephalic and atraumatic.   Mouth/Throat: Oropharynx is clear and moist.   Eyes: Conjunctivae and EOM are normal. Pupils are equal, round, and reactive to light.   Neck: Normal range of motion. Neck supple. No thyromegaly present. No JVD present.   Cardiovascular: Normal rate, regular rhythm and normal heart sounds. Exam reveals no gallop and no friction rub.    No murmur heard.  Pulmonary/Chest: Breath sounds normal. No respiratory distress. He has no wheezes. He exhibits tenderness.   Abdominal: Soft. Bowel sounds are normal. He exhibits no distension. There is no abdominal tenderness. There is no rebound and no guarding.   Musculoskeletal: Normal range of motion. No tenderness or edema.   Neurological: He is alert and oriented to person, place, and time. He has normal strength. No cranial nerve deficit or sensory deficit.   Skin: Skin is warm and dry. Capillary refill takes less than 2 seconds. No rash noted. No erythema.   Psychiatric: He has a normal mood and affect.         ED Course   Procedures  Labs Reviewed   CBC W/ AUTO DIFFERENTIAL - Abnormal; Notable for the following components:       Result Value    Immature Granulocytes 0.7 (*)     All other components within normal limits   COMPREHENSIVE METABOLIC PANEL - Abnormal; Notable for the following components:    Potassium 3.4 (*)     Glucose 128 (*)     Total Bilirubin 1.5 (*)     Alkaline Phosphatase 49 (*)     All other components within normal limits    Narrative:     Analyzer gave error and held specimen on board. had to home   instrument twice. Spoke with Sandra in ER at ext 1910 by SH3   10/24/2020 14:17   TROPONIN I    Narrative:     Analyzer gave error and held specimen on board. had to home    instrument twice. Spoke with Sandra in ER at ext 1910 by SH3   10/24/2020 14:17   B-TYPE NATRIURETIC PEPTIDE    Narrative:     Analyzer gave error and held specimen on board. had to home   instrument twice. Spoke with Sandra in ER at ext 1910 by SH3   10/24/2020 14:17        ECG Results          EKG 12-lead (In process)  Result time 10/24/20 13:03:06    In process by Interface, Lab In [facility] (10/24/20 13:03:06)                 Narrative:    Test Reason : R07.89,    Vent. Rate : 078 BPM     Atrial Rate : 078 BPM     P-R Int : 136 ms          QRS Dur : 094 ms      QT Int : 378 ms       P-R-T Axes : 071 029 032 degrees     QTc Int : 430 ms    Normal sinus rhythm  Possible Left atrial enlargement  Incomplete right bundle branch block  Borderline Abnormal ECG  When compared with ECG of 20-SEP-2020 02:48,  No significant change was found    Referred By: [email or web address]   SELF           Confirmed By:                             Imaging Results          X-Ray Chest AP Portable (Final result)  Result time 10/24/20 12:43:18    Final result by Woody Keith MD (10/24/20 12:43:18)                 Narrative:    HISTORY: Right-sided chest wall pain.    FINDINGS: Portable chest radiograph at 1247 hours compared to multiple  prior exams including 10/01/2020 shows the cardiomediastinal  silhouette and pulmonary vasculature are within normal limits.    The lungs are normally expanded, with no consolidation, large pleural  effusion, or evidence of pulmonary edema. No confluent infiltrates or  pneumothorax. There are no significant osseous abnormalities.    IMPRESSION: No evidence of active cardiopulmonary disease.    Electronically Signed by Woody MCKEON on 10/24/2020 1:20 PM                               Medical Decision Making:   Initial Assessment:   45-year-old male on initial assessment in mild distress secondary to chest pain.  Patient is alert and oriented x3, neurologically and neurovascular intact no focal deficits.  He is  nontoxic appearing and vitals stable at this time.  Differential Diagnosis:   MI, angina, GERD, pleuritic chest pain, pneumonia  Independently Interpreted Test(s):   I have ordered and independently interpreted X-rays - see prior notes.  I have ordered and independently interpreted EKG Reading(s) - see prior notes  Clinical Tests:   Lab Tests: Ordered and Reviewed  The following lab test(s) were unremarkable: CBC, CMP and Troponin  Radiological Study: Ordered and Reviewed  Medical Tests: Ordered and Reviewed  ED Management:  Patient has been reassessed noted to have no acute changes condition.  Labs images unremarkable for any acute pathology requiring further hospital admission or treatment this time.  Patient has remained stable while in the ED and discharged home stable condition with Robaxin ibuprofen secondary to likely pleuritic chest wall pain.  Mr. Brady is aware of the plan and in agreement with discharge.                             Clinical Impression:       ICD-10-CM ICD-9-CM   1. Chest wall pain  R07.89 786.52   2. Chest pain  R07.9 786.50   3. Pleuritic chest pain  R07.81 786.52                          ED Disposition Condition    Discharge Stable        ED Prescriptions     Medication Sig Dispense Start Date End Date Auth. Provider    methocarbamoL (ROBAXIN) 500 MG Tab Take 2 tablets (1,000 mg total) by mouth 3 (three) times daily. for 5 days 30 tablet 10/24/2020 10/29/2020 Stephen Bowling MD    ibuprofen (ADVIL,MOTRIN) 600 MG tablet Take 1 tablet (600 mg total) by mouth every 6 (six) hours as needed. 20 tablet 10/24/2020  Stephen Bowling MD        Follow-up Information     Follow up With Specialties Details Why Contact Info Additional Information    [facility]  As needed, If symptoms worsen [address] [facility] [id number] [phone] 1st floor    Chris Duffy NP Family Medicine Schedule an appointment as soon as possible for a visit  As needed  [address] [facility]  [place] [address]  [phone]                                          Stephen Bowling MD  10/24/20 3057